# Patient Record
Sex: FEMALE | Race: WHITE | NOT HISPANIC OR LATINO | Employment: FULL TIME | URBAN - METROPOLITAN AREA
[De-identification: names, ages, dates, MRNs, and addresses within clinical notes are randomized per-mention and may not be internally consistent; named-entity substitution may affect disease eponyms.]

---

## 2017-02-21 ENCOUNTER — HISTORICAL (OUTPATIENT)
Dept: LAB | Facility: HOSPITAL | Age: 32
End: 2017-02-21

## 2018-12-10 ENCOUNTER — HISTORICAL (OUTPATIENT)
Dept: RADIOLOGY | Facility: HOSPITAL | Age: 33
End: 2018-12-10

## 2020-01-01 ENCOUNTER — HISTORICAL (OUTPATIENT)
Dept: ADMINISTRATIVE | Facility: HOSPITAL | Age: 35
End: 2020-01-01

## 2021-05-25 LAB
ALBUMIN SERPL-MCNC: 4.6 G/DL (ref 3.4–5)
ALBUMIN/GLOB SERPL: 1.5 {RATIO}
ALP SERPL-CCNC: 69 U/L (ref 50–144)
ALT SERPL-CCNC: 38 U/L (ref 1–45)
ANION GAP SERPL CALC-SCNC: 12 MMOL/L (ref 7–16)
AST SERPL-CCNC: 29 U/L (ref 14–36)
BASOPHILS # BLD AUTO: 0.02 X10(3)/MCL (ref 0.01–0.08)
BASOPHILS NFR BLD AUTO: 0.2 % (ref 0.1–1.2)
BILIRUB SERPL-MCNC: 0.63 MG/DL (ref 0.1–1)
BUN SERPL-MCNC: 10 MG/DL (ref 7–20)
CALCIUM SERPL-MCNC: 9.7 MG/DL (ref 8.4–10.2)
CHLORIDE SERPL-SCNC: 101 MMOL/L (ref 94–110)
CHOLEST SERPL-MCNC: 215 MG/DL (ref 0–200)
CO2 SERPL-SCNC: 26 MMOL/L (ref 21–32)
CREAT SERPL-MCNC: 0.5 MG/DL (ref 0.52–1.04)
CREAT/UREA NIT SERPL: 20 (ref 12–20)
EOSINOPHIL # BLD AUTO: 0.12 X10(3)/MCL (ref 0.04–0.36)
EOSINOPHIL NFR BLD AUTO: 1.3 % (ref 0.7–7)
ERYTHROCYTE [DISTWIDTH] IN BLOOD BY AUTOMATED COUNT: 12.1 % (ref 11–14.5)
EST. AVERAGE GLUCOSE BLD GHB EST-MCNC: 129 MG/DL (ref 70–115)
GLOBULIN SER-MCNC: 3.1 G/DL (ref 2–3.9)
GLUCOSE SERPL-MCNC: 102 MGM./DL (ref 70–115)
HBA1C MFR BLD: 6.3 % (ref 4–6)
HCT VFR BLD AUTO: 39 % (ref 36–48)
HDLC SERPL-MCNC: 56 MG/DL (ref 40–60)
HGB BLD-MCNC: 13.1 G/DL (ref 11.8–16)
IMM GRANULOCYTES # BLD AUTO: 0.02 X10E3/UL (ref 0–0.03)
IMM GRANULOCYTES NFR BLD AUTO: 0.2 % (ref 0–0.5)
LDLC SERPL CALC-MCNC: 128.3 MG/DL (ref 30–100)
LYMPHOCYTES # BLD AUTO: 3.15 X10(3)/MCL (ref 1.16–3.74)
LYMPHOCYTES NFR BLD AUTO: 34 % (ref 20–55)
MCH RBC QN AUTO: 32.5 PG (ref 27–34)
MCHC RBC AUTO-ENTMCNC: 33.6 G/DL (ref 31–37)
MCV RBC AUTO: 96.8 FL (ref 79–99)
MONOCYTES # BLD AUTO: 0.58 X10(3)/MCL (ref 0.24–0.36)
MONOCYTES NFR BLD AUTO: 6.3 % (ref 4.7–12.5)
NEUTROPHILS # BLD AUTO: 5.37 X10(3)/MCL (ref 1.56–6.13)
NEUTROPHILS NFR BLD AUTO: 58 % (ref 37–73)
PLATELET # BLD AUTO: 361 X10(3)/MCL (ref 140–371)
PMV BLD AUTO: 10.9 FL (ref 9.4–12.4)
POTASSIUM SERPL-SCNC: 4.7 MMOL/L (ref 3.5–5.1)
PROT SERPL-MCNC: 7.7 G/DL (ref 6.3–8.2)
RBC # BLD AUTO: 4.03 X10(6)/MCL (ref 4–5.1)
SODIUM SERPL-SCNC: 139 MMOL/L (ref 135–145)
T4 FREE SERPL-MCNC: 0.83 NG/DL (ref 0.78–2.19)
TRIGL SERPL-MCNC: 111 MG/DL (ref 30–200)
TSH SERPL-ACNC: 1.77 UIU/ML (ref 0.36–3.74)
WBC # SPEC AUTO: 9.3 X10(3)/MCL (ref 4–11.5)

## 2021-08-31 ENCOUNTER — HISTORICAL (OUTPATIENT)
Dept: LAB | Facility: HOSPITAL | Age: 36
End: 2021-08-31

## 2021-08-31 LAB
ALBUMIN SERPL-MCNC: 3.9 GM/DL (ref 3.5–5)
ALBUMIN/GLOB SERPL: 1.1 RATIO (ref 1.1–2)
ALP SERPL-CCNC: 79 UNIT/L (ref 40–150)
ALT SERPL-CCNC: 21 UNIT/L (ref 0–55)
AMYLASE SERPL-CCNC: 55 UNIT/L (ref 25–125)
AST SERPL-CCNC: 15 UNIT/L (ref 5–34)
BILIRUB SERPL-MCNC: 0.4 MG/DL
BILIRUBIN DIRECT+TOT PNL SERPL-MCNC: 0.2 MG/DL (ref 0–0.5)
BILIRUBIN DIRECT+TOT PNL SERPL-MCNC: 0.2 MG/DL (ref 0–0.8)
BUN SERPL-MCNC: 8 MG/DL (ref 7–18.7)
CALCIUM SERPL-MCNC: 9.7 MG/DL (ref 8.4–10.2)
CHLORIDE SERPL-SCNC: 103 MMOL/L (ref 98–107)
CHOLEST SERPL-MCNC: 208 MG/DL
CHOLEST/HDLC SERPL: 4 {RATIO} (ref 0–5)
CO2 SERPL-SCNC: 29 MMOL/L (ref 22–29)
CREAT SERPL-MCNC: 0.77 MG/DL (ref 0.55–1.02)
EST. AVERAGE GLUCOSE BLD GHB EST-MCNC: 131.2 MG/DL
GLOBULIN SER-MCNC: 3.4 GM/DL (ref 2.4–3.5)
GLUCOSE SERPL-MCNC: 131 MG/DL (ref 74–100)
HBA1C MFR BLD: 6.2 %
HDLC SERPL-MCNC: 50 MG/DL (ref 35–60)
LDLC SERPL CALC-MCNC: 137 MG/DL (ref 50–140)
LIPASE SERPL-CCNC: 38 U/L
POTASSIUM SERPL-SCNC: 5 MMOL/L (ref 3.5–5.1)
PROT SERPL-MCNC: 7.3 GM/DL (ref 6.4–8.3)
SODIUM SERPL-SCNC: 140 MMOL/L (ref 136–145)
TRIGL SERPL-MCNC: 105 MG/DL (ref 37–140)
VIT B12 SERPL-MCNC: 536 PG/ML (ref 213–816)
VLDLC SERPL CALC-MCNC: 21 MG/DL

## 2021-12-22 LAB
ALBUMIN SERPL-MCNC: 4.6 G/DL (ref 3.4–5)
ALBUMIN/GLOB SERPL: 1.5 {RATIO}
ALP SERPL-CCNC: 79 U/L (ref 50–144)
ALT SERPL-CCNC: 51 U/L (ref 1–45)
AMPHET UR QL SCN: ABNORMAL
ANION GAP SERPL CALC-SCNC: 8 MMOL/L (ref 2–13)
AST SERPL-CCNC: 59 U/L (ref 14–36)
BARBITURATE SCN PRESENT UR: ABNORMAL
BASOPHILS # BLD AUTO: 0.03 10*3/UL (ref 0.01–0.08)
BASOPHILS NFR BLD AUTO: 0.5 % (ref 0.1–1.2)
BENZODIAZ UR QL SCN: ABNORMAL
BILIRUB SERPL-MCNC: 0.39 MG/DL (ref 0–1)
BUN SERPL-MCNC: 8 MG/DL (ref 7–20)
CALCIUM SERPL-MCNC: 9.5 MG/DL (ref 8.4–10.2)
CANNABINOIDS UR QL SCN: ABNORMAL
CHLORIDE SERPL-SCNC: 105 MMOL/L (ref 94–110)
CO2 SERPL-SCNC: 28 MMOL/L (ref 21–32)
COCAINE UR QL SCN: ABNORMAL
CREAT SERPL-MCNC: 0.63 MG/DL (ref 0.52–1.04)
CREAT/UREA NIT SERPL: 12.7 (ref 12–20)
DEPRECATED CALCIDIOL+CALCIFEROL SERPL-MC: 43.9 NG/ML (ref 30–100)
EOSINOPHIL # BLD AUTO: 0.12 10*3/UL (ref 0.04–0.36)
EOSINOPHIL NFR BLD AUTO: 2 % (ref 0.7–7)
ERYTHROCYTE [DISTWIDTH] IN BLOOD BY AUTOMATED COUNT: 11.8 % (ref 11–14.5)
EST CREAT CLEARANCE SER (OHS): 112.06 ML/MIN
EST. AVERAGE GLUCOSE BLD GHB EST-MCNC: 201 MG/DL (ref 70–115)
GLOBULIN SER-MCNC: 3 G/DL (ref 2–3.9)
GLUCOSE SERPL-MCNC: 211 MGM./DL (ref 70–115)
HBA1C MFR BLD: 8.7 % (ref 4–6)
HCT VFR BLD AUTO: 39.2 % (ref 36–48)
HGB BLD-MCNC: 13.2 G/DL (ref 11.8–16)
IMM GRANULOCYTES # BLD AUTO: 0.02 10*3/UL (ref 0–0.03)
IMM GRANULOCYTES NFR BLD AUTO: 0.3 % (ref 0–0.5)
LYMPHOCYTES # BLD AUTO: 2.32 10*3/UL (ref 1.16–3.74)
LYMPHOCYTES NFR BLD AUTO: 39.2 % (ref 20–55)
MCH RBC QN AUTO: 32.2 PG (ref 27–34)
MCHC RBC AUTO-ENTMCNC: 33.7 G/DL (ref 31–37)
MCV RBC AUTO: 95.6 FL (ref 79–99)
METHADONE UR QL SCN: ABNORMAL
MONOCYTES # BLD AUTO: 0.54 10*3/UL (ref 0.24–0.36)
MONOCYTES NFR BLD AUTO: 9.1 % (ref 4.7–12.5)
NEUTROPHILS # BLD AUTO: 2.89 10*3/UL (ref 1.56–6.13)
NEUTROPHILS NFR BLD AUTO: 48.9 % (ref 37–73)
OPIATES UR QL SCN: ABNORMAL
PCP UR QL: ABNORMAL
PLATELET # BLD AUTO: 348 10*3/UL (ref 140–371)
PMV BLD AUTO: 10.7 FL (ref 9.4–12.4)
POTASSIUM SERPL-SCNC: 4.7 MMOL/L (ref 3.5–5.1)
PROT SERPL-MCNC: 7.6 G/DL (ref 6.3–8.2)
RBC # BLD AUTO: 4.1 10*6/UL (ref 4–5.1)
SODIUM SERPL-SCNC: 141 MMOL/L (ref 135–145)
TSH SERPL-ACNC: 1.25 UIU/ML (ref 0.36–3.74)
VIT B12 SERPL-MCNC: 672 PG/ML (ref 211–946)
WBC # SPEC AUTO: 5.9 10*3/UL (ref 4–11.5)

## 2022-04-10 ENCOUNTER — HISTORICAL (OUTPATIENT)
Dept: ADMINISTRATIVE | Facility: HOSPITAL | Age: 37
End: 2022-04-10

## 2022-04-25 VITALS
OXYGEN SATURATION: 100 % | DIASTOLIC BLOOD PRESSURE: 76 MMHG | BODY MASS INDEX: 22.46 KG/M2 | WEIGHT: 126.75 LBS | HEIGHT: 63 IN | SYSTOLIC BLOOD PRESSURE: 100 MMHG

## 2022-05-01 ENCOUNTER — HISTORICAL (OUTPATIENT)
Dept: ADMINISTRATIVE | Facility: HOSPITAL | Age: 37
End: 2022-05-01

## 2022-05-03 NOTE — HISTORICAL [facility]ER
This is a historical note converted from Phill. Formatting and pictures may have been removed.  Please reference Phill for original formatting and attached multimedia. Chief Complaint   I dont know, I guess med refills  History of Present Illness  36 WF PMH DM2, ADHD, Anxiety/Depression, psoriasis, smoker in office for ADHD for a routine visit. She is established with endocrinology and last seen in 2/2021.? She has been on immediate release Adderall since she was in her 20s and previously seeing in office PMNHNP.? She is in need of refills on adderall.? She reports that blood sugar is well controlled, no highs.? She is starting to have cravings again but they had stopped when she started Victoza. She has no complaints today and states that all medications are working well without side effects.  Review of Systems  See HPI  Physical Exam  Vitals & Measurements  T:?36.7? ?C (Temporal Artery)? HR:?84(Peripheral)? BP:?98/70?  HT:?160.00?cm? WT:?61.000?kg? BMI:?23.83? LMP:?05/01/2021 00:00 CDT?  Constitutional: General Appearance: healthy-appearing, well-nourished, and well-developed. Level of Distress: NAD.  ?   Psychiatric: Insight: good judgement. Mental Status: active and alert and normal mood.  ?   Head: Head: normocephalic and atraumatic.  ?   ENMT: Hearing: Conversational Hearing Normal. Oropharynx: moist mucous membranes.  ?   Lungs: Respiratory effort: no dyspnea. Auscultation: no wheezing, rales/crackles, or rhonchi.  ?   Cardiovascular: Apical Impulse: not displaced. Heart Auscultation: RRR and no murmurs.  ?   Abdomen: Bowel Sounds: normal. Inspection and Palpation: soft, non-distended, and no tenderness.  ?   Neurologic: Gait and Station: normal gait and station.  ?  Skin: Inspection and palpation: good turgor and no jaundice; scalp psoriasis.  Assessment/Plan  1.?Encounter for well adult exam with abnormal findings ? Z00.01  2.?Major depressive disorder, recurrent episode, moderate ? F33.1  3.?Diabetes  mellitus ? E11.9  4.?Attention deficit hyperactivity disorder (ADHD), predominantly inattentive type ? F90.0  Orders:  amphetamine-dextroamphetamine, 30 mg = 1 tab(s), Oral, BID, # 60 tab(s), 0 Refill(s), Pharmacy: [facility] #[id number], Patient Education Provided, Patient Verbalizes Understanding, 160, cm, Height/Length Dosing, 05/25/21 13:27:00 CDT, 61, kg, Weight Dosing, 05/25/21 13:27:00 CDT  amphetamine-dextroamphetamine, 30 mg = 1 tab(s), Oral, BID, # 60 tab(s), 0 Refill(s), Pharmacy: [facility] #[id number], 160, cm, Height/Length Dosing, 05/25/21 13:27:00 CDT, 61, kg, Weight Dosing, 05/25/21 13:27:00 CDT  valACYclovir, 500 mg = 1 tab(s), Oral, Daily, # 30 tab(s), 0 Refill(s), Pharmacy: [facility] #[id number], 160, cm, Height/Length Dosing, 05/25/21 13:27:00 CDT, 61, kg, Weight Dosing, 05/25/21 13:27:00 CDT  Office/Outpatient Visit Level 4 Established [id number] PC, Major depressive disorder, recurrent episode, moderate  Diabetes mellitus  Attention deficit hyperactivity disorder (ADHD), predominantly inattentive type, [facility], 05/25/21 13:45:00 CDT  ?  labs collected in office, will call with abnormal results.  continue adderall ir.?  she will continue seeing PMHNP in office once available  keep all specialist appointments.  RTC annually for wellness.  ?   Problem List/Past Medical History  Ongoing  Anemia  Anxiety  Attention deficit hyperactivity disorder (ADHD), predominantly inattentive type  Diabetes mellitus  Insomnia  Major depressive disorder, recurrent episode, moderate  Panic attacks  Historical  Pregnant  Pregnant  Psoriasis  Tobacco user  Procedure/Surgical History  Injection, epidural, of blood or clot patch (01/16/2020)  Introduction of Other Therapeutic Substance into Epidural Space, Percutaneous Approach (01/16/2020)  Drainage of Spinal Canal, Percutaneous Approach, Diagnostic (01/08/2020)  PAP SMEAR-HPV + (03/11/2019)  Destruction of Bilateral Fallopian Tubes, Percutaneous  Endoscopic Approach (10/28/2016)  Laparoscopy, surgical; with fulguration of oviducts (with or without transection) (10/28/2016)  Tubal Ligation Laparoscopic (.) (10/28/2016)  Tubal Ligation (10/01/2016)   Medications  Adderall 30 mg oral tablet, 30 mg= 1 tab(s), Oral, BID  Adderall 30 mg oral tablet, 30 mg= 1 tab(s), Oral, BID  Blood glucose monitor, See Instructions  citric acid-sodium citrate 334 mg-500 mg/5 mL oral solution, 30 mL, Oral, Once  glipizide-metformin 2.5 mg-500 mg oral tablet, 1 tab(s), Oral, BID  Glucose Test Strips, See Instructions, 11 refills  Hair, Skin & Nails, 5 mg, Oral, Daily  hydrOXYzine hydrochloride 10 mg oral tablet, See Instructions, PRN, 5 refills  Lancets, See Instructions, 11 refills  Lexapro 10 mg oral tablet, 10 mg= 1 tab(s), Oral, Daily  valacyclovir 500 mg oral tablet, 500 mg= 1 tab(s), Oral, Daily  Victoza 18 mg/3 mL subcutaneous injection, 1.8 mg= 1.8 mg, Subcutaneous, Daily  Allergies  sulfa drugs  Social History  Abuse/Neglect  No, 05/25/2021  No, 07/20/2020  Alcohol  Current, Beer, Liquor, 1-2 times per week, 10/26/2016  Sexual  Sexually active: Yes. Yes, 06/23/2020  Substance Use  Never, 12/05/2019  Tobacco  Former smoker, quit more than 30 days ago, Yes, 05/25/2021  Family History  Congestive heart disease.: Mother.  Hepatitis C.: Father.  Hypertension.: Mother.  Malignant tumor of breast: Maternal Grandmother and Maternal Grandmother.  Immunizations  Vaccine Date Status   poliovirus vaccine, inactivated 04/03/1990 Recorded   diphtheria/pertussis, acel/tetanus ped 04/03/1990 Recorded   poliovirus vaccine, inactivated 06/24/1986 Recorded   measles/mumps/rubella virus vaccine 06/24/1986 Recorded   diphtheria/pertussis, acel/tetanus ped 06/24/1986 Recorded   diphtheria/pertussis, acel/tetanus ped 1985 Recorded   poliovirus vaccine, inactivated 1985 Recorded   diphtheria/pertussis, acel/tetanus ped 1985 Recorded   poliovirus vaccine, inactivated 1985  Recorded   diphtheria/pertussis, acel/tetanus ped 1985 Recorded   Health Maintenance  Health Maintenance  ???Pending?(in the next year)  ??? ??OverDue  ??? ? ? ?Alcohol Misuse Screening due??01/02/21??and every 1??year(s)  ??? ??Due?  ??? ? ? ?ADL Screening due??05/25/21??and every 1??year(s)  ??? ? ? ?Diabetes Maintenance-Foot Exam due??05/25/21??Unknown Frequency  ??? ? ? ?Diabetes Maintenance-Microalbumin due??05/25/21??Unknown Frequency  ??? ? ? ?Tetanus Vaccine due??05/25/21??and every 10??year(s)  ??? ??Due In Future?  ??? ? ? ?Obesity Screening not due until??01/01/22??and every 1??year(s)  ??? ? ? ?Diabetes Maintenance-Eye Exam not due until??04/21/22??and every 1??year(s)  ???Satisfied?(in the past 1 year)  ??? ??Satisfied?  ??? ? ? ?Blood Pressure Screening on??05/25/21.??Satisfied by Jermaine Ortega  ??? ? ? ?Body Mass Index Check on??05/25/21.??Satisfied by Jermaine Ortega  ??? ? ? ?Cervical Cancer Screening on??06/24/20.??Satisfied by Yvonne Simental MA  ??? ? ? ?Diabetes Maintenance-Fasting Lipid Profile on??05/25/21.??Satisfied by Contributor_system, JENNINGS_EVIDENT  ??? ? ? ?Diabetes Screening on??05/25/21.??Satisfied by Contributor_system, JENNINGS_EVIDENT  ??? ? ? ?Influenza Vaccine on??02/10/21.??Satisfied by Kamille Davison  ??? ? ? ?Obesity Screening on??05/25/21.??Satisfied by Jermaine Ortega  ??? ??Refused?  ??? ? ? ?Influenza Vaccine on??05/25/21.??Recorded by Jermaine Ortega??Reason: Patient Refuses  ?

## 2022-05-21 ENCOUNTER — HISTORICAL (OUTPATIENT)
Dept: ADMINISTRATIVE | Facility: HOSPITAL | Age: 37
End: 2022-05-21

## 2022-08-23 ENCOUNTER — HISTORICAL (OUTPATIENT)
Dept: ADMINISTRATIVE | Facility: HOSPITAL | Age: 37
End: 2022-08-23

## 2022-08-23 LAB
LEFT EYE DM RETINOPATHY: NEGATIVE
RIGHT EYE DM RETINOPATHY: NEGATIVE

## 2022-08-24 LAB
HUMAN PAPILLOMAVIRUS (HPV): NORMAL
PAP RECOMMENDATION EXT: NORMAL
PAP SMEAR: NORMAL

## 2023-01-10 ENCOUNTER — DOCUMENTATION ONLY (OUTPATIENT)
Dept: ADMINISTRATIVE | Facility: HOSPITAL | Age: 38
End: 2023-01-10

## 2023-02-27 LAB
LEFT EYE DM RETINOPATHY: NEGATIVE
RIGHT EYE DM RETINOPATHY: NEGATIVE

## 2023-03-03 ENCOUNTER — DOCUMENTATION ONLY (OUTPATIENT)
Dept: FAMILY MEDICINE | Facility: CLINIC | Age: 38
End: 2023-03-03
Payer: MEDICAID

## 2023-03-29 ENCOUNTER — OFFICE VISIT (OUTPATIENT)
Dept: BEHAVIORAL HEALTH | Facility: CLINIC | Age: 38
End: 2023-03-29
Payer: MEDICAID

## 2023-03-29 VITALS
TEMPERATURE: 97 F | HEART RATE: 101 BPM | OXYGEN SATURATION: 99 % | DIASTOLIC BLOOD PRESSURE: 80 MMHG | BODY MASS INDEX: 26.87 KG/M2 | WEIGHT: 146 LBS | HEIGHT: 62 IN | SYSTOLIC BLOOD PRESSURE: 110 MMHG

## 2023-03-29 DIAGNOSIS — G47.00 INSOMNIA, UNSPECIFIED TYPE: ICD-10-CM

## 2023-03-29 DIAGNOSIS — F90.9 ATTENTION DEFICIT HYPERACTIVITY DISORDER (ADHD), UNSPECIFIED ADHD TYPE: ICD-10-CM

## 2023-03-29 DIAGNOSIS — F41.1 GENERALIZED ANXIETY DISORDER: ICD-10-CM

## 2023-03-29 DIAGNOSIS — Z72.0 TOBACCO USER: ICD-10-CM

## 2023-03-29 DIAGNOSIS — F41.0 PANIC ATTACK: ICD-10-CM

## 2023-03-29 DIAGNOSIS — F33.1 MAJOR DEPRESSIVE DISORDER, RECURRENT EPISODE, MODERATE: Primary | ICD-10-CM

## 2023-03-29 PROCEDURE — 3008F PR BODY MASS INDEX (BMI) DOCUMENTED: ICD-10-PCS | Mod: CPTII,,, | Performed by: NURSE PRACTITIONER

## 2023-03-29 PROCEDURE — 3074F SYST BP LT 130 MM HG: CPT | Mod: CPTII,,, | Performed by: NURSE PRACTITIONER

## 2023-03-29 PROCEDURE — 3079F DIAST BP 80-89 MM HG: CPT | Mod: CPTII,,, | Performed by: NURSE PRACTITIONER

## 2023-03-29 PROCEDURE — 3079F PR MOST RECENT DIASTOLIC BLOOD PRESSURE 80-89 MM HG: ICD-10-PCS | Mod: CPTII,,, | Performed by: NURSE PRACTITIONER

## 2023-03-29 PROCEDURE — 3074F PR MOST RECENT SYSTOLIC BLOOD PRESSURE < 130 MM HG: ICD-10-PCS | Mod: CPTII,,, | Performed by: NURSE PRACTITIONER

## 2023-03-29 PROCEDURE — 1160F RVW MEDS BY RX/DR IN RCRD: CPT | Mod: CPTII,,, | Performed by: NURSE PRACTITIONER

## 2023-03-29 PROCEDURE — 1160F PR REVIEW ALL MEDS BY PRESCRIBER/CLIN PHARMACIST DOCUMENTED: ICD-10-PCS | Mod: CPTII,,, | Performed by: NURSE PRACTITIONER

## 2023-03-29 PROCEDURE — 1159F PR MEDICATION LIST DOCUMENTED IN MEDICAL RECORD: ICD-10-PCS | Mod: CPTII,,, | Performed by: NURSE PRACTITIONER

## 2023-03-29 PROCEDURE — 99214 PR OFFICE/OUTPT VISIT, EST, LEVL IV, 30-39 MIN: ICD-10-PCS | Mod: SA,HB,, | Performed by: NURSE PRACTITIONER

## 2023-03-29 PROCEDURE — 1159F MED LIST DOCD IN RCRD: CPT | Mod: CPTII,,, | Performed by: NURSE PRACTITIONER

## 2023-03-29 PROCEDURE — 99214 OFFICE O/P EST MOD 30 MIN: CPT | Mod: SA,HB,, | Performed by: NURSE PRACTITIONER

## 2023-03-29 PROCEDURE — 3008F BODY MASS INDEX DOCD: CPT | Mod: CPTII,,, | Performed by: NURSE PRACTITIONER

## 2023-03-29 RX ORDER — PANTOPRAZOLE SODIUM 40 MG/1
40 TABLET, DELAYED RELEASE ORAL DAILY
COMMUNITY
Start: 2023-03-27 | End: 2023-08-17 | Stop reason: ALTCHOICE

## 2023-03-29 RX ORDER — VALACYCLOVIR HYDROCHLORIDE 500 MG/1
500 TABLET, FILM COATED ORAL DAILY
COMMUNITY
Start: 2023-03-27 | End: 2023-09-25

## 2023-03-29 RX ORDER — INSULIN GLARGINE 100 [IU]/ML
16 INJECTION, SOLUTION SUBCUTANEOUS NIGHTLY
COMMUNITY
Start: 2023-03-27

## 2023-03-29 RX ORDER — METFORMIN HYDROCHLORIDE 500 MG/1
500 TABLET, EXTENDED RELEASE ORAL 2 TIMES DAILY
COMMUNITY
Start: 2023-03-28

## 2023-03-29 RX ORDER — DEXTROAMPHETAMINE SACCHARATE, AMPHETAMINE ASPARTATE, DEXTROAMPHETAMINE SULFATE AND AMPHETAMINE SULFATE 7.5; 7.5; 7.5; 7.5 MG/1; MG/1; MG/1; MG/1
30 TABLET ORAL 2 TIMES DAILY
Qty: 60 TABLET | Refills: 0 | Status: SHIPPED | OUTPATIENT
Start: 2023-03-29 | End: 2023-04-28

## 2023-03-29 RX ORDER — LINACLOTIDE 72 UG/1
72 CAPSULE, GELATIN COATED ORAL EVERY MORNING
COMMUNITY
Start: 2023-03-09 | End: 2023-05-08

## 2023-03-29 RX ORDER — GLIMEPIRIDE 4 MG/1
4 TABLET ORAL DAILY
COMMUNITY
Start: 2023-03-27 | End: 2023-09-19

## 2023-03-29 RX ORDER — ESCITALOPRAM OXALATE 20 MG/1
20 TABLET ORAL DAILY
COMMUNITY
Start: 2023-03-09 | End: 2023-03-29

## 2023-03-29 RX ORDER — DEXTROAMPHETAMINE SACCHARATE, AMPHETAMINE ASPARTATE, DEXTROAMPHETAMINE SULFATE AND AMPHETAMINE SULFATE 7.5; 7.5; 7.5; 7.5 MG/1; MG/1; MG/1; MG/1
30 TABLET ORAL 2 TIMES DAILY
Qty: 60 TABLET | Refills: 0 | Status: SHIPPED | OUTPATIENT
Start: 2023-04-28 | End: 2023-05-28

## 2023-03-29 RX ORDER — BLOOD-GLUCOSE SENSOR
1 EACH MISCELLANEOUS
COMMUNITY
Start: 2023-03-09

## 2023-03-29 RX ORDER — DEXTROAMPHETAMINE SACCHARATE, AMPHETAMINE ASPARTATE, DEXTROAMPHETAMINE SULFATE AND AMPHETAMINE SULFATE 7.5; 7.5; 7.5; 7.5 MG/1; MG/1; MG/1; MG/1
30 TABLET ORAL 2 TIMES DAILY
Qty: 60 TABLET | Refills: 0 | Status: SHIPPED | OUTPATIENT
Start: 2023-05-28 | End: 2023-07-24 | Stop reason: SDUPTHER

## 2023-03-29 RX ORDER — ROSUVASTATIN CALCIUM 10 MG/1
10 TABLET, COATED ORAL NIGHTLY
COMMUNITY
Start: 2023-03-09

## 2023-03-29 RX ORDER — VORTIOXETINE 10 MG/1
10 TABLET, FILM COATED ORAL DAILY
Qty: 7 TABLET | Refills: 0
Start: 2023-03-29 | End: 2023-04-05

## 2023-03-29 RX ORDER — TIRZEPATIDE 2.5 MG/.5ML
1 INJECTION, SOLUTION SUBCUTANEOUS WEEKLY
COMMUNITY
Start: 2023-03-18 | End: 2023-04-21 | Stop reason: DRUGHIGH

## 2023-03-29 NOTE — PROGRESS NOTES
"PSYCHIATRIC FOLLOW-UP VISIT NOTE    Chief Complaint   Patient presents with    Depression     "I am having too many depressive episodes and not coping well with them. I would like a med change."         History of Present Illness  37 y.o. year old White female with hx of MDD, COLUMBA, panic attacks, ADHD, and insomnia seen today for follow-up appointment and medication management.  For the past several weeks she is noticed mood has been more depressed multiple days per week.  Her previous coping skills are no longer effective to help her deal with her increased depression effectively.  She is had some increased stress spinning what she describes as giving to local [employer] razors.  Denies that this is excessive spending and denies that it was impulsive.  Feels that helping others helps her mood as well.  She is not put herself in any financial difficulties by these donations and does not feel that there impulsive or out of her control.  Reports things are going well at home.  Adderall still effective for focus and concentration.  Good executive function.  Denies any side effects from current medications.  Denies any feelings of hopelessness, appetite changes, or tearful episodes.  She is sleeping well and feels rested in the morning.  She expressed a desire to stop her Lexapro and try different antidepressant at this time.  We have discussed this previously and today we agreed to have her take a half dose of her Lexapro for the next 3 days then discontinue.  She will start Trintellix 10 mg tomorrow morning and we will re-evaluate her depressive symptoms in a few weeks. Patient denies SI/HI. Denies hallucinations and does not appear to be responding to internal stimuli or be internally preoccupied. No manic symptoms noted.       Objective:     Vitals:  Vitals:    03/29/23 1010   BP: 110/80   BP Location: Left arm   Patient Position: Sitting   Pulse: 101   Temp: 97 °F (36.1 °C)   TempSrc: Temporal   SpO2: 99% " "  Weight: 66.2 kg (146 lb)   Height: 5' 2" (1.575 m)       Wt Readings from Last 3 Encounters:   03/29/23 1010 66.2 kg (146 lb)   12/22/21 1047 57.5 kg (126 lb 12.2 oz)   09/17/21 0942 58.2 kg (128 lb 4.9 oz)   08/12/21 1335 57 kg (125 lb 10.6 oz)   07/27/21 1016 59.4 kg (130 lb 15.3 oz)   05/25/21 1323 61 kg (134 lb 7.7 oz)   02/10/21 1010 69.6 kg (153 lb 7 oz)   11/19/20 1107 68.3 kg (150 lb 9.2 oz)   10/22/20 1402 68 kg (149 lb 14.6 oz)   06/26/20 1451 55.5 kg (122 lb 5.7 oz)   06/24/20 1513 55 kg (121 lb 4.1 oz)   02/17/20 0956 62.8 kg (138 lb 7.2 oz)   12/23/19 1317 65.1 kg (143 lb 8.3 oz)         Medication:    Current Outpatient Medications:     DEXCOM G6 SENSOR Zara, 1 application by Implant route every 10 days., Disp: , Rfl:     glimepiride (AMARYL) 4 MG tablet, Take 4 mg by mouth Daily., Disp: , Rfl:     LANTUS SOLOSTAR U-100 INSULIN glargine 100 units/mL SubQ pen, Inject 20 Units into the skin nightly., Disp: , Rfl:     LINZESS 72 mcg Cap capsule, Take 72 mcg by mouth every morning., Disp: , Rfl:     metFORMIN (GLUCOPHAGE-XR) 500 MG ER 24hr tablet, Take 500 mg by mouth 2 (two) times daily., Disp: , Rfl:     MOUNJARO 2.5 mg/0.5 mL PnIj, Inject 1 pen into the skin once a week., Disp: , Rfl:     pantoprazole (PROTONIX) 40 MG tablet, Take 40 mg by mouth once daily., Disp: , Rfl:     rosuvastatin (CRESTOR) 10 MG tablet, Take 10 mg by mouth every evening., Disp: , Rfl:     valACYclovir (VALTREX) 500 MG tablet, Take 500 mg by mouth once daily., Disp: , Rfl:     dextroamphetamine-amphetamine (ADDERALL) 30 mg Tab, Take 1 tablet (30 mg total) by mouth 2 (two) times a day., Disp: 60 tablet, Rfl: 0    [START ON 4/28/2023] dextroamphetamine-amphetamine (ADDERALL) 30 mg Tab, Take 1 tablet (30 mg total) by mouth 2 (two) times a day., Disp: 60 tablet, Rfl: 0    [START ON 5/28/2023] dextroamphetamine-amphetamine (ADDERALL) 30 mg Tab, Take 1 tablet (30 mg total) by mouth 2 (two) times a day., Disp: 60 tablet, Rfl: 0    " "vortioxetine (TRINTELLIX) 10 mg Tab, Take 1 tablet (10 mg total) by mouth once daily., Disp: 30 tablet, Rfl: 1    vortioxetine (TRINTELLIX) 10 mg Tab, Take 1 tablet (10 mg total) by mouth Daily. for 7 days, Disp: 7 tablet, Rfl: 0       Significant Labs: - none at this time    Significant Imaging: - none at this time    Physical Exam  Vitals and nursing note reviewed.   Constitutional:       General: She is awake.      Appearance: Normal appearance.   Musculoskeletal:      Comments: Full ROM   Neurological:      Mental Status: She is alert.   Psychiatric:         Attention and Perception: Attention and perception normal. She does not perceive auditory or visual hallucinations.         Mood and Affect: Affect normal.         Speech: Speech normal.         Behavior: Behavior is cooperative.         Thought Content: Thought content does not include homicidal or suicidal ideation.         Cognition and Memory: Cognition and memory normal.        Review of Systems     Mental Status Exam:  Presentation:  - Appearance: 37 y.o. year old White female, appears stated age, appears Casually dressed and Well groomed  - Motility: Erect when standing, Steady gait, No EPS or Tremors, No psychomotor agitation or retardation appreciated  - Behavior: calm, cooperative, good eye contact  Speech:  - Character/Organization: spontaneous, fluent, normal volume, normal rate, normal rhythm  Emotional State:  - Mood: "depressed"   - Affect: congruent, sad, and anxious  Thought:  - Process: logical, linear, organized , goal-directed  - Preoccupations: no ruminations, rituals, or phobias appreciated  - Delusions: no persecutory, paranoid, or grandiose delusions appreciated  - Perception: denies AVH, not actively responding to internal stimuli  - SI/HI: denies/denies  Sensorium & Intellect:  - Sensorium: AAOx4  - Memory: intact to recent and remote events  - Attention/Concentration: good/good  - Insight/Judgement: good/good    Gait: normal swing " and stance  MSK:no rigidity appreciated    All other systems without acute issues unless noted in HPI      Assessment/Plan      ICD-10-CM ICD-9-CM    1. Major depressive disorder, recurrent episode, moderate  F33.1 296.32 vortioxetine (TRINTELLIX) 10 mg Tab      vortioxetine (TRINTELLIX) 10 mg Tab      2. Panic attack  F41.0 300.01       3. Generalized anxiety disorder  F41.1 300.02       4. Attention deficit hyperactivity disorder (ADHD), unspecified ADHD type  F90.9 314.01 dextroamphetamine-amphetamine (ADDERALL) 30 mg Tab      dextroamphetamine-amphetamine (ADDERALL) 30 mg Tab      dextroamphetamine-amphetamine (ADDERALL) 30 mg Tab      5. Insomnia, unspecified type  G47.00 780.52       6. Tobacco user  Z72.0 305.1          Take 10 mg of Lexapro for the next 3 days then discontinue    Begin Trintellix 10 mg daily tomorrow    Continue other medications without change    Reviewed non-pharmacologic coping skills to decrease anxiety, such as deep breathing, journaling, exercise, recognizing triggers, meditation, healthy diet and exercise, routine sleep schedule, negative thought recognition, time for hobbies/interests, relaxation techniques, avoidance of substance abuse, limiting caffeine, benefits of counseling, and biofeedback. Patient verbalized understanding.     checked. Results as expected. No suspected diversion or abuse of controlled substances.    Potential side effects and risks vs benefits of current treatment plan reviewed with patient. Applicable black box warnings reviewed. Encouraged patient not to alter dosages or abruptly discontinue medications without contacting prescriber first, due to risk of worsening symptoms and decompensation of mental status. Warned of risks associated with herbal remedies and supplements while taking psychotropic medications and of the need to consult prescriber prior to adding any of these to current regimen. Patient should abstain from abuse of alcohol, prescription  medications, and illicit drugs. Reviewed when to contact clinic and/or seek emergent care, such as but not limited to, onset/worsening SI/HI, hallucinations, delusions, manic symptoms. Pt verbalized understanding and agreement of these warnings/recommendations and verbally consented to treatment plan.      Follow up in about 4 weeks (around 4/26/2023) for Medication Management.    Yefri Medrano, LUCHOP

## 2023-03-31 ENCOUNTER — DOCUMENTATION ONLY (OUTPATIENT)
Dept: BEHAVIORAL HEALTH | Facility: CLINIC | Age: 38
End: 2023-03-31
Payer: MEDICAID

## 2023-04-03 ENCOUNTER — TELEPHONE (OUTPATIENT)
Dept: FAMILY MEDICINE | Facility: CLINIC | Age: 38
End: 2023-04-03
Payer: MEDICAID

## 2023-04-03 NOTE — TELEPHONE ENCOUNTER
Pt states that the trintellix is not working she wants to go back to the Lexapro, I spoke to Devon Medrano and he advised she restart the lexapro d/c the trintellix and if she is not better by Thursday, she should call to come in sooner then the f/u that is scheduled for the 21st. Pt verbalized understanding

## 2023-04-21 ENCOUNTER — OFFICE VISIT (OUTPATIENT)
Dept: BEHAVIORAL HEALTH | Facility: CLINIC | Age: 38
End: 2023-04-21
Payer: MEDICAID

## 2023-04-21 VITALS
DIASTOLIC BLOOD PRESSURE: 70 MMHG | SYSTOLIC BLOOD PRESSURE: 106 MMHG | WEIGHT: 145 LBS | BODY MASS INDEX: 26.68 KG/M2 | HEART RATE: 91 BPM | HEIGHT: 62 IN | OXYGEN SATURATION: 97 % | TEMPERATURE: 98 F

## 2023-04-21 DIAGNOSIS — G47.00 INSOMNIA, UNSPECIFIED TYPE: ICD-10-CM

## 2023-04-21 DIAGNOSIS — F41.1 GENERALIZED ANXIETY DISORDER: ICD-10-CM

## 2023-04-21 DIAGNOSIS — F41.0 PANIC ATTACK: ICD-10-CM

## 2023-04-21 DIAGNOSIS — F33.1 MAJOR DEPRESSIVE DISORDER, RECURRENT EPISODE, MODERATE: Primary | ICD-10-CM

## 2023-04-21 DIAGNOSIS — F90.9 ATTENTION DEFICIT HYPERACTIVITY DISORDER (ADHD), UNSPECIFIED ADHD TYPE: ICD-10-CM

## 2023-04-21 PROCEDURE — 1160F RVW MEDS BY RX/DR IN RCRD: CPT | Mod: CPTII,,, | Performed by: NURSE PRACTITIONER

## 2023-04-21 PROCEDURE — 1159F MED LIST DOCD IN RCRD: CPT | Mod: CPTII,,, | Performed by: NURSE PRACTITIONER

## 2023-04-21 PROCEDURE — 1159F PR MEDICATION LIST DOCUMENTED IN MEDICAL RECORD: ICD-10-PCS | Mod: CPTII,,, | Performed by: NURSE PRACTITIONER

## 2023-04-21 PROCEDURE — 1160F PR REVIEW ALL MEDS BY PRESCRIBER/CLIN PHARMACIST DOCUMENTED: ICD-10-PCS | Mod: CPTII,,, | Performed by: NURSE PRACTITIONER

## 2023-04-21 PROCEDURE — 3008F BODY MASS INDEX DOCD: CPT | Mod: CPTII,,, | Performed by: NURSE PRACTITIONER

## 2023-04-21 PROCEDURE — 99214 OFFICE O/P EST MOD 30 MIN: CPT | Mod: SA,HB,, | Performed by: NURSE PRACTITIONER

## 2023-04-21 PROCEDURE — 3074F PR MOST RECENT SYSTOLIC BLOOD PRESSURE < 130 MM HG: ICD-10-PCS | Mod: CPTII,,, | Performed by: NURSE PRACTITIONER

## 2023-04-21 PROCEDURE — 3074F SYST BP LT 130 MM HG: CPT | Mod: CPTII,,, | Performed by: NURSE PRACTITIONER

## 2023-04-21 PROCEDURE — 3078F DIAST BP <80 MM HG: CPT | Mod: CPTII,,, | Performed by: NURSE PRACTITIONER

## 2023-04-21 PROCEDURE — 3008F PR BODY MASS INDEX (BMI) DOCUMENTED: ICD-10-PCS | Mod: CPTII,,, | Performed by: NURSE PRACTITIONER

## 2023-04-21 PROCEDURE — 3078F PR MOST RECENT DIASTOLIC BLOOD PRESSURE < 80 MM HG: ICD-10-PCS | Mod: CPTII,,, | Performed by: NURSE PRACTITIONER

## 2023-04-21 PROCEDURE — 99214 PR OFFICE/OUTPT VISIT, EST, LEVL IV, 30-39 MIN: ICD-10-PCS | Mod: SA,HB,, | Performed by: NURSE PRACTITIONER

## 2023-04-21 RX ORDER — HYDROXYZINE HYDROCHLORIDE 10 MG/1
1 TABLET, FILM COATED ORAL NIGHTLY
COMMUNITY
Start: 2023-04-15 | End: 2023-05-19 | Stop reason: SDUPTHER

## 2023-04-21 RX ORDER — TIRZEPATIDE 5 MG/.5ML
1 INJECTION, SOLUTION SUBCUTANEOUS WEEKLY
COMMUNITY
Start: 2023-04-12 | End: 2023-09-19

## 2023-04-21 RX ORDER — ESCITALOPRAM OXALATE 20 MG/1
20 TABLET ORAL DAILY
COMMUNITY
Start: 2023-04-20 | End: 2023-05-19 | Stop reason: SDUPTHER

## 2023-04-21 NOTE — PROGRESS NOTES
"PSYCHIATRIC FOLLOW-UP VISIT NOTE    Chief Complaint   Patient presents with    Mood     "I feel better since restarting the lexapro."         History of Present Illness  38 y.o. year old White female with hx of MDD, panic attacks, COLUMBA, ADHD, and insomnia seen today for follow-up appointment and medication management.  Reports she is been doing well since resuming Lexapro.  She was unable to tolerate the discontinuation of that medication in initiation of Trintellix.  She was feeling increased depression and anxiety and tearful daily during that time.  Since stopping Trintellix and resuming Lexapro she is been feeling much better.  Describes depression is minimal at this time and continued improvement over the last week.  No recent panic attacks during that time.  Feels anxiety is more well-controlled at this point.  Denies any side effects from current medication regimen.  She is made an initial counseling appointment with Benigno Lester and we will begin those sessions next week.  She is looking forward to this and feels that she is ready to begin that process.  She is sleeping well and feels rested in the morning.  Also reports improved blood sugar control following medication adjustments by her PCP.  Focus and concentration are good.  No issues with hyperactivity, task completion, or executive function.  Patient denies SI/HI. Denies hallucinations and does not appear to be responding to internal stimuli or be internally preoccupied. No manic symptoms noted.       Objective:     Vitals:  Vitals:    04/21/23 1139   BP: 106/70   BP Location: Left arm   Patient Position: Sitting   Pulse: 91   Temp: 98.4 °F (36.9 °C)   TempSrc: Temporal   SpO2: 97%   Weight: 65.8 kg (145 lb)   Height: 5' 2" (1.575 m)       Wt Readings from Last 3 Encounters:   04/21/23 1139 65.8 kg (145 lb)   03/29/23 1010 66.2 kg (146 lb)   12/22/21 1047 57.5 kg (126 lb 12.2 oz)   09/17/21 0942 58.2 kg (128 lb 4.9 oz)   08/12/21 1335 57 kg (125 lb " 10.6 oz)   07/27/21 1016 59.4 kg (130 lb 15.3 oz)   05/25/21 1323 61 kg (134 lb 7.7 oz)   02/10/21 1010 69.6 kg (153 lb 7 oz)   11/19/20 1107 68.3 kg (150 lb 9.2 oz)   10/22/20 1402 68 kg (149 lb 14.6 oz)   06/26/20 1451 55.5 kg (122 lb 5.7 oz)   06/24/20 1513 55 kg (121 lb 4.1 oz)   02/17/20 0956 62.8 kg (138 lb 7.2 oz)   12/23/19 1317 65.1 kg (143 lb 8.3 oz)         Medication:    Current Outpatient Medications:     DEXCOM G6 SENSOR Zara, 1 application by Implant route every 10 days., Disp: , Rfl:     dextroamphetamine-amphetamine (ADDERALL) 30 mg Tab, Take 1 tablet (30 mg total) by mouth 2 (two) times a day., Disp: 60 tablet, Rfl: 0    [START ON 4/28/2023] dextroamphetamine-amphetamine (ADDERALL) 30 mg Tab, Take 1 tablet (30 mg total) by mouth 2 (two) times a day., Disp: 60 tablet, Rfl: 0    [START ON 5/28/2023] dextroamphetamine-amphetamine (ADDERALL) 30 mg Tab, Take 1 tablet (30 mg total) by mouth 2 (two) times a day., Disp: 60 tablet, Rfl: 0    EScitalopram oxalate (LEXAPRO) 20 MG tablet, Take 20 mg by mouth Daily., Disp: , Rfl:     glimepiride (AMARYL) 4 MG tablet, Take 4 mg by mouth Daily., Disp: , Rfl:     hydrOXYzine HCL (ATARAX) 10 MG Tab, Take 1 tablet by mouth nightly., Disp: , Rfl:     LANTUS SOLOSTAR U-100 INSULIN glargine 100 units/mL SubQ pen, Inject 16 Units into the skin nightly., Disp: , Rfl:     LINZESS 72 mcg Cap capsule, Take 72 mcg by mouth every morning., Disp: , Rfl:     metFORMIN (GLUCOPHAGE-XR) 500 MG ER 24hr tablet, Take 500 mg by mouth 2 (two) times daily., Disp: , Rfl:     MOUNJARO 5 mg/0.5 mL PnIj, Inject 1 pen into the skin once a week., Disp: , Rfl:     pantoprazole (PROTONIX) 40 MG tablet, Take 40 mg by mouth once daily., Disp: , Rfl:     rosuvastatin (CRESTOR) 10 MG tablet, Take 10 mg by mouth every evening., Disp: , Rfl:     valACYclovir (VALTREX) 500 MG tablet, Take 500 mg by mouth once daily., Disp: , Rfl:        Significant Labs: - none at this time    Significant Imaging: -  "none at this time    Physical Exam  Vitals and nursing note reviewed.   Constitutional:       General: She is awake.      Appearance: Normal appearance.   Musculoskeletal:      Comments: Full ROM   Neurological:      Mental Status: She is alert.   Psychiatric:         Attention and Perception: Attention and perception normal. She does not perceive auditory or visual hallucinations.         Mood and Affect: Affect normal.         Speech: Speech normal.         Behavior: Behavior is cooperative.         Thought Content: Thought content does not include homicidal or suicidal ideation.         Cognition and Memory: Cognition and memory normal.        Review of Systems     Mental Status Exam:  Presentation:  - Appearance: 38 y.o. year old White female, appears stated age, appears Casually dressed and Well groomed  - Motility: Erect when standing, Steady gait, No EPS or Tremors, No psychomotor agitation or retardation appreciated  - Behavior: calm, cooperative, good eye contact  Speech:  - Character/Organization: spontaneous, fluent, normal volume, normal rate, normal rhythm  Emotional State:  - Mood: "improving"   - Affect: congruent and appropriate  Thought:  - Process: logical, linear, organized , goal-directed  - Preoccupations: no ruminations, rituals, or phobias appreciated  - Delusions: no persecutory, paranoid, or grandiose delusions appreciated  - Perception: denies AVH, not actively responding to internal stimuli  - SI/HI: denies/denies  Sensorium & Intellect:  - Sensorium: AAOx4  - Memory: intact to recent and remote events  - Attention/Concentration: good/good  - Insight/Judgement: good/good    Gait: normal swing and stance  MSK:no rigidity appreciated    All other systems without acute issues unless noted in HPI      Assessment/Plan      ICD-10-CM ICD-9-CM    1. Major depressive disorder, recurrent episode, moderate  F33.1 296.32       2. Panic attack  F41.0 300.01       3. Generalized anxiety disorder  F41.1 " 300.02       4. Attention deficit hyperactivity disorder (ADHD), unspecified ADHD type  F90.9 314.01       5. Insomnia, unspecified type  G47.00 780.52          Discontinue Trintellix    Continue other medications without change. Does not require refills today    Potential side effects and risks vs benefits of current treatment plan reviewed with patient. Applicable black box warnings reviewed. Encouraged patient not to alter dosages or abruptly discontinue medications without contacting prescriber first, due to risk of worsening symptoms and decompensation of mental status. Warned of risks associated with herbal remedies and supplements while taking psychotropic medications and of the need to consult prescriber prior to adding any of these to current regimen. Patient should abstain from abuse of alcohol, prescription medications, and illicit drugs. Reviewed when to contact clinic and/or seek emergent care, such as but not limited to, onset/worsening SI/HI, hallucinations, delusions, manic symptoms. Pt verbalized understanding and agreement of these warnings/recommendations and verbally consented to treatment plan.    Keep scheduled counseling appointment    Follow up in about 4 weeks (around 5/19/2023) for Medication Management.    Yefri Medrano, LUCHOP

## 2023-05-08 DIAGNOSIS — K59.00 CONSTIPATION, UNSPECIFIED CONSTIPATION TYPE: Primary | ICD-10-CM

## 2023-05-08 RX ORDER — LINACLOTIDE 72 UG/1
CAPSULE, GELATIN COATED ORAL
Qty: 30 CAPSULE | Refills: 1 | Status: SHIPPED | OUTPATIENT
Start: 2023-05-08 | End: 2023-08-17 | Stop reason: ALTCHOICE

## 2023-05-19 ENCOUNTER — OFFICE VISIT (OUTPATIENT)
Dept: BEHAVIORAL HEALTH | Facility: CLINIC | Age: 38
End: 2023-05-19
Payer: MEDICAID

## 2023-05-19 VITALS
WEIGHT: 140 LBS | SYSTOLIC BLOOD PRESSURE: 102 MMHG | OXYGEN SATURATION: 98 % | HEART RATE: 101 BPM | DIASTOLIC BLOOD PRESSURE: 74 MMHG | HEIGHT: 62 IN | BODY MASS INDEX: 25.76 KG/M2 | TEMPERATURE: 98 F

## 2023-05-19 DIAGNOSIS — Z72.0 TOBACCO USER: ICD-10-CM

## 2023-05-19 DIAGNOSIS — F41.0 PANIC ATTACK: ICD-10-CM

## 2023-05-19 DIAGNOSIS — F41.1 GENERALIZED ANXIETY DISORDER: ICD-10-CM

## 2023-05-19 DIAGNOSIS — F33.1 MAJOR DEPRESSIVE DISORDER, RECURRENT EPISODE, MODERATE: Primary | ICD-10-CM

## 2023-05-19 DIAGNOSIS — G47.00 INSOMNIA, UNSPECIFIED TYPE: ICD-10-CM

## 2023-05-19 DIAGNOSIS — F90.9 ATTENTION DEFICIT HYPERACTIVITY DISORDER (ADHD), UNSPECIFIED ADHD TYPE: ICD-10-CM

## 2023-05-19 PROCEDURE — 3078F PR MOST RECENT DIASTOLIC BLOOD PRESSURE < 80 MM HG: ICD-10-PCS | Mod: CPTII,,, | Performed by: NURSE PRACTITIONER

## 2023-05-19 PROCEDURE — 1159F PR MEDICATION LIST DOCUMENTED IN MEDICAL RECORD: ICD-10-PCS | Mod: CPTII,,, | Performed by: NURSE PRACTITIONER

## 2023-05-19 PROCEDURE — 3074F PR MOST RECENT SYSTOLIC BLOOD PRESSURE < 130 MM HG: ICD-10-PCS | Mod: CPTII,,, | Performed by: NURSE PRACTITIONER

## 2023-05-19 PROCEDURE — 99214 PR OFFICE/OUTPT VISIT, EST, LEVL IV, 30-39 MIN: ICD-10-PCS | Mod: SA,HB,, | Performed by: NURSE PRACTITIONER

## 2023-05-19 PROCEDURE — 1160F RVW MEDS BY RX/DR IN RCRD: CPT | Mod: CPTII,,, | Performed by: NURSE PRACTITIONER

## 2023-05-19 PROCEDURE — 99214 OFFICE O/P EST MOD 30 MIN: CPT | Mod: SA,HB,, | Performed by: NURSE PRACTITIONER

## 2023-05-19 PROCEDURE — 3008F BODY MASS INDEX DOCD: CPT | Mod: CPTII,,, | Performed by: NURSE PRACTITIONER

## 2023-05-19 PROCEDURE — 3074F SYST BP LT 130 MM HG: CPT | Mod: CPTII,,, | Performed by: NURSE PRACTITIONER

## 2023-05-19 PROCEDURE — 3008F PR BODY MASS INDEX (BMI) DOCUMENTED: ICD-10-PCS | Mod: CPTII,,, | Performed by: NURSE PRACTITIONER

## 2023-05-19 PROCEDURE — 3078F DIAST BP <80 MM HG: CPT | Mod: CPTII,,, | Performed by: NURSE PRACTITIONER

## 2023-05-19 PROCEDURE — 1159F MED LIST DOCD IN RCRD: CPT | Mod: CPTII,,, | Performed by: NURSE PRACTITIONER

## 2023-05-19 PROCEDURE — 1160F PR REVIEW ALL MEDS BY PRESCRIBER/CLIN PHARMACIST DOCUMENTED: ICD-10-PCS | Mod: CPTII,,, | Performed by: NURSE PRACTITIONER

## 2023-05-19 RX ORDER — ESCITALOPRAM OXALATE 20 MG/1
20 TABLET ORAL DAILY
Qty: 30 TABLET | Refills: 1 | Status: SHIPPED | OUTPATIENT
Start: 2023-05-19 | End: 2023-07-24

## 2023-05-19 RX ORDER — HYDROXYZINE HYDROCHLORIDE 10 MG/1
10 TABLET, FILM COATED ORAL NIGHTLY
Qty: 30 TABLET | Refills: 1 | Status: SHIPPED | OUTPATIENT
Start: 2023-05-19 | End: 2023-07-24 | Stop reason: SDUPTHER

## 2023-05-19 NOTE — PROGRESS NOTES
"  PSYCHIATRIC FOLLOW-UP VISIT NOTE    Chief Complaint   Patient presents with    Mood     "I have no complaints."         History of Present Illness  38 y.o. year old White female with hx of MDD, COLUMBA, panic attacks, ADHD, insomnia, and tobacco seen today for follow-up appointment and medication management.  Reports she is doing very well on current medication regimen.  Denies any recent depression.  No anhedonia, low motivation, low energy, appetite changes, feelings of shame or guilt, isolative behaviors, or thoughts of self-harm.  Anxiety also well-controlled with current medication regimen.  Denies any recent panic attacks, excessive worry, restlessness, difficulty relaxing, or physical signs and symptoms of anxiety.  She is sleeping well at night and feels rested in the morning.  Focus and concentration are good.  No issues with task completion or executive function.  She also denies any side effects from current medication regimen. Patient denies SI/HI. Denies hallucinations and does not appear to be responding to internal stimuli or be internally preoccupied. No manic symptoms noted.       Objective:     Vitals:  Vitals:    05/19/23 1005   BP: 102/74   BP Location: Left arm   Patient Position: Sitting   Pulse: 101   Temp: 97.5 °F (36.4 °C)   TempSrc: Temporal   SpO2: 98%   Weight: 63.5 kg (140 lb)   Height: 5' 2" (1.575 m)       Wt Readings from Last 3 Encounters:   05/19/23 1005 63.5 kg (140 lb)   04/21/23 1139 65.8 kg (145 lb)   03/29/23 1010 66.2 kg (146 lb)         Medication:    Current Outpatient Medications:     DEXCOM G6 SENSOR Zara, 1 application by Implant route every 10 days., Disp: , Rfl:     dextroamphetamine-amphetamine (ADDERALL) 30 mg Tab, Take 1 tablet (30 mg total) by mouth 2 (two) times a day., Disp: 60 tablet, Rfl: 0    [START ON 5/28/2023] dextroamphetamine-amphetamine (ADDERALL) 30 mg Tab, Take 1 tablet (30 mg total) by mouth 2 (two) times a day., Disp: 60 tablet, Rfl: 0    glimepiride " (AMARYL) 4 MG tablet, Take 4 mg by mouth Daily., Disp: , Rfl:     LANTUS SOLOSTAR U-100 INSULIN glargine 100 units/mL SubQ pen, Inject 16 Units into the skin nightly., Disp: , Rfl:     linaCLOtide (LINZESS) 72 mcg Cap capsule, TAKE 1 CAPSULE BY MOUTH ONCE DAILY, Disp: 30 capsule, Rfl: 1    metFORMIN (GLUCOPHAGE-XR) 500 MG ER 24hr tablet, Take 500 mg by mouth 2 (two) times daily., Disp: , Rfl:     MOUNJARO 5 mg/0.5 mL PnIj, Inject 1 pen into the skin once a week., Disp: , Rfl:     pantoprazole (PROTONIX) 40 MG tablet, Take 40 mg by mouth once daily., Disp: , Rfl:     rosuvastatin (CRESTOR) 10 MG tablet, Take 10 mg by mouth every evening., Disp: , Rfl:     valACYclovir (VALTREX) 500 MG tablet, Take 500 mg by mouth once daily., Disp: , Rfl:     EScitalopram oxalate (LEXAPRO) 20 MG tablet, Take 1 tablet (20 mg total) by mouth Daily., Disp: 30 tablet, Rfl: 1    hydrOXYzine HCL (ATARAX) 10 MG Tab, Take 1 tablet (10 mg total) by mouth nightly., Disp: 30 tablet, Rfl: 1       Significant Labs: - none at this time    Significant Imaging: - none at this time    Physical Exam  Vitals and nursing note reviewed.   Constitutional:       General: She is awake.      Appearance: Normal appearance.   Musculoskeletal:      Comments: Full ROM   Neurological:      Mental Status: She is alert.   Psychiatric:         Attention and Perception: Attention and perception normal. She does not perceive auditory or visual hallucinations.         Mood and Affect: Affect normal.         Speech: Speech normal.         Behavior: Behavior is cooperative.         Thought Content: Thought content does not include homicidal or suicidal ideation.         Cognition and Memory: Cognition and memory normal.        Review of Systems     Mental Status Exam:  Presentation:  - Appearance: 38 y.o. year old White female, appears stated age, appears Casually dressed and Well groomed  - Motility: Erect when standing, Steady gait, No EPS or Tremors, No psychomotor  "agitation or retardation appreciated  - Behavior: calm, cooperative, good eye contact  Speech:  - Character/Organization: spontaneous, fluent, normal volume, normal rate, normal rhythm  Emotional State:  - Mood: "happy"   - Affect: congruent and appropriate  Thought:  - Process: logical, linear, organized , goal-directed  - Preoccupations: no ruminations, rituals, or phobias appreciated  - Delusions: no persecutory, paranoid, or grandiose delusions appreciated  - Perception: denies AVH, not actively responding to internal stimuli  - SI/HI: denies/denies  Sensorium & Intellect:  - Sensorium: AAOx4  - Memory: intact to recent and remote events  - Attention/Concentration: good/good  - Insight/Judgement: good/good    Gait: normal swing and stance  MSK:no rigidity appreciated    All other systems without acute issues unless noted in HPI      Assessment/Plan      ICD-10-CM ICD-9-CM    1. Major depressive disorder, recurrent episode, moderate  F33.1 296.32 EScitalopram oxalate (LEXAPRO) 20 MG tablet      2. Generalized anxiety disorder  F41.1 300.02       3. Panic attack  F41.0 300.01       4. Attention deficit hyperactivity disorder (ADHD), unspecified ADHD type  F90.9 314.01       5. Insomnia, unspecified type  G47.00 780.52 hydrOXYzine HCL (ATARAX) 10 MG Tab      6. Tobacco user  Z72.0 305.1          Continue current medications without change    Potential side effects and risks vs benefits of current treatment plan reviewed with patient. Applicable black box warnings reviewed. Encouraged patient not to alter dosages or abruptly discontinue medications without contacting prescriber first, due to risk of worsening symptoms and decompensation of mental status. Warned of risks associated with herbal remedies and supplements while taking psychotropic medications and of the need to consult prescriber prior to adding any of these to current regimen. Patient should abstain from abuse of alcohol, prescription medications, and " illicit drugs. Reviewed when to contact clinic and/or seek emergent care, such as but not limited to, onset/worsening SI/HI, hallucinations, delusions, manic symptoms. Pt verbalized understanding and agreement of these warnings/recommendations and verbally consented to treatment plan.    Encouraged smoking cessation and reinforced negative effects of continued use. Assistance with smoking cessation was offered, including medications, counseling, printed information, and referral to smoking cessation program. Encouraged patient to visit [email or web address] for further information and resources.      Follow up in about 2 months (around 7/19/2023) for Medication Management.    Yefri Medrano, LUCHOP

## 2023-05-25 ENCOUNTER — LAB VISIT (OUTPATIENT)
Dept: LAB | Facility: HOSPITAL | Age: 38
End: 2023-05-25
Attending: INTERNAL MEDICINE
Payer: MEDICAID

## 2023-05-25 DIAGNOSIS — E78.00 PURE HYPERCHOLESTEROLEMIA: ICD-10-CM

## 2023-05-25 DIAGNOSIS — E11.00 TYPE II DIABETES MELLITUS WITH HYPEROSMOLARITY, UNCONTROLLED: Primary | ICD-10-CM

## 2023-05-25 LAB
ALBUMIN SERPL-MCNC: 4.8 G/DL (ref 3.4–5)
ALBUMIN/GLOB SERPL: 1.8 RATIO
ALP SERPL-CCNC: 62 UNIT/L (ref 50–144)
ALT SERPL-CCNC: 36 UNIT/L (ref 1–45)
ANION GAP SERPL CALC-SCNC: 7 MEQ/L (ref 2–13)
AST SERPL-CCNC: 36 UNIT/L (ref 14–36)
BILIRUBIN DIRECT+TOT PNL SERPL-MCNC: 0.3 MG/DL (ref 0–1)
BUN SERPL-MCNC: 14 MG/DL (ref 7–20)
CALCIUM SERPL-MCNC: 9 MG/DL (ref 8.4–10.2)
CHLORIDE SERPL-SCNC: 104 MMOL/L (ref 98–110)
CHOLEST SERPL-MCNC: 135 MG/DL (ref 0–200)
CO2 SERPL-SCNC: 27 MMOL/L (ref 21–32)
CREAT SERPL-MCNC: 0.8 MG/DL (ref 0.66–1.25)
CREAT/UREA NIT SERPL: 18 (ref 12–20)
EST. AVERAGE GLUCOSE BLD GHB EST-MCNC: 128.4 MG/DL (ref 70–115)
GFR SERPLBLD CREATININE-BSD FMLA CKD-EPI: >90 MLS/MIN/1.73/M2
GLOBULIN SER-MCNC: 2.7 GM/DL (ref 2–3.9)
GLUCOSE SERPL-MCNC: 141 MG/DL (ref 70–115)
HBA1C MFR BLD: 6.1 % (ref 4–6)
HDLC SERPL-MCNC: 62 MG/DL (ref 40–60)
LDLC SERPL DIRECT ASSAY-SCNC: 56.5 MG/DL (ref 30–100)
POTASSIUM SERPL-SCNC: 4.7 MMOL/L (ref 3.5–5.1)
PROT SERPL-MCNC: 7.5 GM/DL (ref 6.3–8.2)
SODIUM SERPL-SCNC: 138 MMOL/L (ref 135–145)
TRIGL SERPL-MCNC: 65 MG/DL (ref 30–200)

## 2023-05-25 PROCEDURE — 83036 HEMOGLOBIN GLYCOSYLATED A1C: CPT

## 2023-05-25 PROCEDURE — 80061 LIPID PANEL: CPT

## 2023-05-25 PROCEDURE — 36415 COLL VENOUS BLD VENIPUNCTURE: CPT

## 2023-05-25 PROCEDURE — 80053 COMPREHEN METABOLIC PANEL: CPT

## 2023-07-23 DIAGNOSIS — G47.00 INSOMNIA, UNSPECIFIED TYPE: ICD-10-CM

## 2023-07-23 DIAGNOSIS — F90.9 ATTENTION DEFICIT HYPERACTIVITY DISORDER (ADHD), UNSPECIFIED ADHD TYPE: ICD-10-CM

## 2023-07-23 DIAGNOSIS — F33.1 MAJOR DEPRESSIVE DISORDER, RECURRENT EPISODE, MODERATE: ICD-10-CM

## 2023-07-24 RX ORDER — HYDROXYZINE HYDROCHLORIDE 10 MG/1
10 TABLET, FILM COATED ORAL NIGHTLY
Qty: 30 TABLET | Refills: 1 | Status: SHIPPED | OUTPATIENT
Start: 2023-07-24 | End: 2023-08-17

## 2023-07-24 RX ORDER — ESCITALOPRAM OXALATE 20 MG/1
TABLET ORAL
Qty: 30 TABLET | Refills: 1 | Status: SHIPPED | OUTPATIENT
Start: 2023-07-24 | End: 2023-08-17 | Stop reason: SDUPTHER

## 2023-07-24 RX ORDER — DEXTROAMPHETAMINE SACCHARATE, AMPHETAMINE ASPARTATE, DEXTROAMPHETAMINE SULFATE AND AMPHETAMINE SULFATE 7.5; 7.5; 7.5; 7.5 MG/1; MG/1; MG/1; MG/1
30 TABLET ORAL 2 TIMES DAILY
Qty: 60 TABLET | Refills: 0 | Status: SHIPPED | OUTPATIENT
Start: 2023-07-24 | End: 2023-08-17 | Stop reason: SDUPTHER

## 2023-08-17 ENCOUNTER — PATIENT MESSAGE (OUTPATIENT)
Dept: BEHAVIORAL HEALTH | Facility: CLINIC | Age: 38
End: 2023-08-17
Payer: MEDICAID

## 2023-08-17 ENCOUNTER — OFFICE VISIT (OUTPATIENT)
Dept: BEHAVIORAL HEALTH | Facility: CLINIC | Age: 38
End: 2023-08-17
Payer: MEDICAID

## 2023-08-17 DIAGNOSIS — F33.1 MAJOR DEPRESSIVE DISORDER, RECURRENT EPISODE, MODERATE: Primary | ICD-10-CM

## 2023-08-17 DIAGNOSIS — F41.0 PANIC ATTACK: ICD-10-CM

## 2023-08-17 DIAGNOSIS — G47.00 INSOMNIA, UNSPECIFIED TYPE: ICD-10-CM

## 2023-08-17 DIAGNOSIS — F41.1 GENERALIZED ANXIETY DISORDER: ICD-10-CM

## 2023-08-17 DIAGNOSIS — F90.9 ATTENTION DEFICIT HYPERACTIVITY DISORDER (ADHD), UNSPECIFIED ADHD TYPE: ICD-10-CM

## 2023-08-17 DIAGNOSIS — Z72.0 TOBACCO USER: ICD-10-CM

## 2023-08-17 PROCEDURE — 3044F HG A1C LEVEL LT 7.0%: CPT | Mod: S$PBB,CPTII,NDTC, | Performed by: NURSE PRACTITIONER

## 2023-08-17 PROCEDURE — 1159F MED LIST DOCD IN RCRD: CPT | Mod: S$PBB,CPTII,NDTC, | Performed by: NURSE PRACTITIONER

## 2023-08-17 PROCEDURE — 1160F PR REVIEW ALL MEDS BY PRESCRIBER/CLIN PHARMACIST DOCUMENTED: ICD-10-PCS | Mod: S$PBB,CPTII,NDTC, | Performed by: NURSE PRACTITIONER

## 2023-08-17 PROCEDURE — 1160F RVW MEDS BY RX/DR IN RCRD: CPT | Mod: S$PBB,CPTII,NDTC, | Performed by: NURSE PRACTITIONER

## 2023-08-17 PROCEDURE — 3044F PR MOST RECENT HEMOGLOBIN A1C LEVEL <7.0%: ICD-10-PCS | Mod: S$PBB,CPTII,NDTC, | Performed by: NURSE PRACTITIONER

## 2023-08-17 PROCEDURE — 1159F PR MEDICATION LIST DOCUMENTED IN MEDICAL RECORD: ICD-10-PCS | Mod: S$PBB,CPTII,NDTC, | Performed by: NURSE PRACTITIONER

## 2023-08-17 PROCEDURE — 99214 PR OFFICE/OUTPT VISIT, EST, LEVL IV, 30-39 MIN: ICD-10-PCS | Mod: S$PBB,SA,HB,NDTC | Performed by: NURSE PRACTITIONER

## 2023-08-17 PROCEDURE — 99214 OFFICE O/P EST MOD 30 MIN: CPT | Mod: S$PBB,SA,HB,NDTC | Performed by: NURSE PRACTITIONER

## 2023-08-17 RX ORDER — INSULIN LISPRO 100 [IU]/ML
6 INJECTION, SOLUTION INTRAVENOUS; SUBCUTANEOUS DAILY
COMMUNITY
Start: 2020-08-17

## 2023-08-17 RX ORDER — DEXTROAMPHETAMINE SACCHARATE, AMPHETAMINE ASPARTATE, DEXTROAMPHETAMINE SULFATE AND AMPHETAMINE SULFATE 7.5; 7.5; 7.5; 7.5 MG/1; MG/1; MG/1; MG/1
30 TABLET ORAL 2 TIMES DAILY
Qty: 60 TABLET | Refills: 0 | Status: SHIPPED | OUTPATIENT
Start: 2023-08-17 | End: 2023-11-17 | Stop reason: SDUPTHER

## 2023-08-17 RX ORDER — PEDI MV NO.227/FERROUS SULFATE 10 MG
2 TABLET,CHEWABLE ORAL DAILY
COMMUNITY
Start: 2023-08-03 | End: 2023-11-17

## 2023-08-17 RX ORDER — BLOOD-GLUCOSE TRANSMITTER
EACH MISCELLANEOUS
COMMUNITY
Start: 2023-07-20 | End: 2023-08-17 | Stop reason: SDUPTHER

## 2023-08-17 RX ORDER — ESCITALOPRAM OXALATE 20 MG/1
20 TABLET ORAL DAILY
Qty: 30 TABLET | Refills: 2 | Status: SHIPPED | OUTPATIENT
Start: 2023-08-17 | End: 2023-11-17 | Stop reason: SDUPTHER

## 2023-08-17 NOTE — PROGRESS NOTES
"PSYCHIATRIC FOLLOW-UP VISIT NOTE    Chief Complaint   Patient presents with    3 month follow up     "I have no complaints"         History of Present Illness  38 y.o. year old White female with hx of MDD, panic attacks, nomi, ADHD, insomnia, and tobacco use seen today for follow-up appointment and medication management.  Patient reports she is been doing very well since last visit.  Denies any recent depression and describes mood as happy most days of the week.  She and her family were able to take a vacation to a water park in [place] over the summer and she enjoyed the time away from home with her children.  Denies any recent anxiety issues.  She is able to relax easily in his not easily overwhelmed.  Good focus and concentration.  No issues with task completion or executive function.  She is sleeping well at night and feels rested in the morning.  Denies any recent panic attacks.  No side effects from current medication regimen. Patient denies SI/HI. Denies hallucinations and does not appear to be responding to internal stimuli or be internally preoccupied. No manic symptoms noted.         Objective:     Vitals:  There were no vitals filed for this visit.    Wt Readings from Last 3 Encounters:   05/19/23 1005 63.5 kg (140 lb)   04/21/23 1139 65.8 kg (145 lb)   03/29/23 1010 66.2 kg (146 lb)         Medication:    Current Outpatient Medications:     DEXCOM G6 SENSOR Zara, 1 application by Implant route every 10 days., Disp: , Rfl:     glimepiride (AMARYL) 4 MG tablet, Take 4 mg by mouth Daily., Disp: , Rfl:     insulin lispro (HUMALOG KWIKPEN INSULIN) 100 unit/mL pen, Inject 6 Units into the skin once daily., Disp: , Rfl:     LANTUS SOLOSTAR U-100 INSULIN glargine 100 units/mL SubQ pen, Inject 16 Units into the skin nightly., Disp: , Rfl:     metFORMIN (GLUCOPHAGE-XR) 500 MG ER 24hr tablet, Take 500 mg by mouth 2 (two) times daily., Disp: , Rfl:     MOUNJARO 5 mg/0.5 mL PnIj, Inject 1 pen into the skin once a week., " "Disp: , Rfl:     pedi mv no.227-ferrous sulfate (FLINTSTONES COMPLETE, FE SULF,) 10 mg iron Chew, Take 2 tablets by mouth once daily., Disp: , Rfl:     rosuvastatin (CRESTOR) 10 MG tablet, Take 10 mg by mouth every evening., Disp: , Rfl:     valACYclovir (VALTREX) 500 MG tablet, Take 500 mg by mouth once daily., Disp: , Rfl:     dextroamphetamine-amphetamine (ADDERALL) 30 mg Tab, Take 1 tablet (30 mg total) by mouth 2 (two) times a day., Disp: 60 tablet, Rfl: 0    EScitalopram oxalate (LEXAPRO) 20 MG tablet, Take 1 tablet (20 mg total) by mouth once daily., Disp: 30 tablet, Rfl: 2       Significant Labs: - none at this time    Significant Imaging: - none at this time    Physical Exam  Vitals and nursing note reviewed.   Constitutional:       General: She is awake.      Appearance: Normal appearance.   Musculoskeletal:      Comments: Full ROM   Neurological:      Mental Status: She is alert.   Psychiatric:         Attention and Perception: Attention and perception normal. She does not perceive auditory or visual hallucinations.         Mood and Affect: Affect normal.         Speech: Speech normal.         Behavior: Behavior is cooperative.         Thought Content: Thought content does not include homicidal or suicidal ideation.         Cognition and Memory: Cognition and memory normal.          Review of Systems     Mental Status Exam:  Presentation:  - Appearance: 38 y.o. year old White female, appears stated age, appears Casually dressed and Well groomed  - Motility: Erect when standing, Steady gait, No EPS or Tremors, No psychomotor agitation or retardation appreciated  - Behavior: calm, cooperative, good eye contact  Speech:  - Character/Organization: spontaneous, fluent, normal volume, normal rate, normal rhythm  Emotional State:  - Mood: "Happy"   - Affect: congruent and appropriate  Thought:  - Process: logical, linear, organized , goal-directed  - Preoccupations: no ruminations, rituals, or phobias " appreciated  - Delusions: no persecutory, paranoid, or grandiose delusions appreciated  - Perception: denies AVH, not actively responding to internal stimuli  - SI/HI: denies/denies  Sensorium & Intellect:  - Sensorium: AAOx4  - Memory: intact to recent and remote events  - Attention/Concentration: good/good  - Insight/Judgement: good/good    Gait: normal swing and stance  MSK:no rigidity appreciated    All other systems without acute issues unless noted in HPI      Assessment/Plan      ICD-10-CM ICD-9-CM    1. Major depressive disorder, recurrent episode, moderate  F33.1 296.32 EScitalopram oxalate (LEXAPRO) 20 MG tablet      2. Generalized anxiety disorder  F41.1 300.02       3. Attention deficit hyperactivity disorder (ADHD), unspecified ADHD type  F90.9 314.01 dextroamphetamine-amphetamine (ADDERALL) 30 mg Tab      4. Panic attack  F41.0 300.01       5. Insomnia, unspecified type  G47.00 780.52       6. Tobacco user  Z72.0 305.1          Continue current medications without change    Potential side effects and risks vs benefits of current treatment plan reviewed with patient. Applicable black box warnings reviewed. Encouraged patient not to alter dosages or abruptly discontinue medications without contacting prescriber first, due to risk of worsening symptoms and decompensation of mental status. Warned of risks associated with herbal remedies and supplements while taking psychotropic medications and of the need to consult prescriber prior to adding any of these to current regimen. Patient should abstain from abuse of alcohol, prescription medications, and illicit drugs. Reviewed when to contact clinic and/or seek emergent care, such as but not limited to, onset/worsening SI/HI, hallucinations, delusions, manic symptoms. Pt verbalized understanding and agreement of these warnings/recommendations and verbally consented to treatment plan.     checked. Results as expected. No suspected diversion or abuse of  controlled substances.      Follow up in about 3 months (around 11/17/2023) for Medication Management.    Yefri Medrano, LUCHOP

## 2023-09-13 NOTE — PROGRESS NOTES
Chief Complaint: Annual exam    Chief Complaint   Patient presents with    Well Woman     Annual Gyn exam LP;22 neg w/ neg HPV       HPI:   Nahomi Warren is a 38 y.o. year old  here for her Annual Exam. Reports Type 1 diabetes controlled with Monujaro.  Previously reported symptoms have resolved with control of blood sugars.  Reports occ left sided pelvic pain at time of cycle, but otherwise doing well.     Gyn hx:  LMP: 23  Frequency: monthly  Cycle Length: 3-4 days   Flow: normal  Intermenstrual Bleeding: No  Postcoital Bleeding: No  Dysmenorrhea: Yes, severe some days  Sexually Active: yes  Dyspareunia: No  Contraception: tubal ligation  H/o STI: HSV, HPV  Last pap: 22 neg w/ neg HPV  H/o abnl pap: Yes  due to HPV  Colposcopy: yes and Cyrotherapy  Gardasil: 0/3  MMG: never had one  H/o abnl MMG: n/a  Colonoscopy: never had one    Past Medical History:   Diagnosis Date    ADHD (attention deficit hyperactivity disorder)     Anemia     Anxiety     Depression     Diabetes mellitus     Elevated liver enzymes     Insomnia     Panic attack      Past Surgical History:   Procedure Laterality Date    CERVIX LESION DESTRUCTION      TUBAL LIGATION Bilateral 10/28/2016       Current Outpatient Medications:     DEXCOM G6 SENSOR Zara, 1 application by Implant route every 10 days., Disp: , Rfl:     EScitalopram oxalate (LEXAPRO) 20 MG tablet, Take 1 tablet (20 mg total) by mouth once daily., Disp: 30 tablet, Rfl: 2    insulin lispro (HUMALOG KWIKPEN INSULIN) 100 unit/mL pen, Inject 6 Units into the skin once daily., Disp: , Rfl:     LANTUS SOLOSTAR U-100 INSULIN glargine 100 units/mL SubQ pen, Inject 16 Units into the skin nightly., Disp: , Rfl:     metFORMIN (GLUCOPHAGE-XR) 500 MG ER 24hr tablet, Take 500 mg by mouth 2 (two) times daily., Disp: , Rfl:     MOUNJARO 2.5 mg/0.5 mL PnIj, Inject into the skin., Disp: , Rfl:     pedi  no.227-ferrous sulfate (FLINTSTONES COMPLETE, FE SULF,) 10 mg iron  Chew, Take 2 tablets by mouth once daily., Disp: , Rfl:     rosuvastatin (CRESTOR) 10 MG tablet, Take 10 mg by mouth every evening., Disp: , Rfl:     valACYclovir (VALTREX) 500 MG tablet, Take 500 mg by mouth once daily., Disp: , Rfl:     dextroamphetamine-amphetamine (ADDERALL) 30 mg Tab, Take 1 tablet (30 mg total) by mouth 2 (two) times a day., Disp: 60 tablet, Rfl: 0  Review of patient's allergies indicates:   Allergen Reactions    Sulfa (sulfonamide antibiotics) Other (See Comments)     unknown     OB History    Para Term  AB Living   2 2 1 1   1   SAB IAB Ectopic Multiple Live Births           1      # Outcome Date GA Lbr Raymon/2nd Weight Sex Delivery Anes PTL Lv   2  11 [id number]  2.863 kg (6 lb 5 oz) M Vag-Spont EPI Y    1 Term 04   3.232 kg (7 lb 2 oz) M Vag-Spont EPI  MEGHAN     Social History     Tobacco Use    Smoking status: Former     Types: Cigarettes, Vaping with nicotine    Smokeless tobacco: Never   Substance Use Topics    Alcohol use: Yes     Comment: monthly    Drug use: Never     Family History   Problem Relation Age of Onset    No Known Problems Paternal Grandfather     No Known Problems Paternal Grandmother     Breast cancer Maternal Grandmother     No Known Problems Maternal Grandfather     No Known Problems Father     Anxiety disorder Mother     Depression Mother     No Known Problems Brother     Schizophrenia Sister     Personality disorder Sister     Depression Sister     Rashes / Skin problems Sister         Psoriasis    Seizures Sister     No Known Problems Maternal Aunt     No Known Problems Maternal Uncle     No Known Problems Paternal Aunt     No Known Problems Paternal Uncle     No Known Problems Cousin     No Known Problems Other     Colon cancer Neg Hx     Ovarian cancer Neg Hx     Uterine cancer Neg Hx     Cervical cancer Neg Hx        Review of Systems:   Review of Systems   Constitutional:  Negative for appetite change, chills, fatigue, fever and  "unexpected weight change.   Eyes:  Negative for visual disturbance.   Respiratory:  Negative for cough, shortness of breath and wheezing.    Cardiovascular:  Negative for chest pain, palpitations and leg swelling.   Gastrointestinal:  Negative for abdominal pain, bloating, blood in stool, constipation, diarrhea, nausea, vomiting, reflux and fecal incontinence.   Endocrine: Negative for hair loss and hot flashes.   Genitourinary:  Negative for bladder incontinence, decreased libido, dysmenorrhea, dyspareunia, dysuria, flank pain, frequency, genital sores, hematuria, hot flashes, menorrhagia, menstrual problem, pelvic pain, urgency, vaginal bleeding, vaginal discharge, vaginal pain, urinary incontinence, postcoital bleeding, postmenopausal bleeding, vaginal dryness and vaginal odor.   Integumentary:  Negative for rash, acne, hair changes, breast mass, nipple discharge, breast skin changes and breast tenderness.   Neurological:  Negative for headaches.   Psychiatric/Behavioral:  Negative for depression.    Breast: Negative for asymmetry, breast self exam, lump, mass, mastodynia, nipple discharge, skin changes and tenderness       Physical Exam:  /72 (BP Location: Right arm, Patient Position: Sitting, BP Method: Medium (Manual))   Temp 98.1 °F (36.7 °C) (Temporal)   Ht 5' 2" (1.575 m)   Wt 57.8 kg (127 lb 6.8 oz)   LMP 09/06/2023 (Exact Date)   BMI 23.31 kg/m²       Physical Exam:   Constitutional: She is oriented to person, place, and time. She appears well-developed and well-nourished.    HENT:   Head: Normocephalic.      Cardiovascular:       Exam reveals no edema.        Pulmonary/Chest: Effort normal. She exhibits no mass, no tenderness, no bony tenderness, no deformity and no retraction. Right breast exhibits no inverted nipple, no mass, no nipple discharge, no skin change, no tenderness, no bleeding, no swelling, no mastectomy, no augmentation and no lumpectomy. Left breast exhibits no inverted nipple, " no mass, no nipple discharge, no skin change, no tenderness, no bleeding, no swelling, no mastectomy, no augmentation and no lumpectomy. Breasts are symmetrical.        Abdominal: Soft. She exhibits no distension and no mass. There is no abdominal tenderness. There is no rebound and no guarding. No hernia. Hernia confirmed negative in the right inguinal area.     Genitourinary:    Inguinal canal, vagina, uterus, right adnexa, left adnexa and rectum normal.   Rectum:      No anal fissure or external hemorrhoid.   The external female genitalia was normal.   No external genitalia lesions identified,Genitalia hair distrobution normal .   Labial bartholins normal.There is no rash, tenderness, lesion or injury on the right labia. There is no rash, tenderness, lesion or injury on the left labia. Cervix is normal. No no masses or organomegaly. Right adnexum displays no mass, no tenderness and no fullness. Left adnexum displays no mass, no tenderness and no fullness. Vagina exhibits no lesion. No erythema,  no vaginal discharge, tenderness, bleeding, rectocele, cystocele or unspecified prolapse of vaginal walls in the vagina.    No foreign body in the vagina.      No signs of injury in the vagina.   Vagina was moist.Cervix exhibits no motion tenderness, no lesion, no discharge, no friability, no lesion, no tenderness and no polyp. Uterus consistancy normal. and Uerus contour normal  Uterus is not deviated, not enlarged, not fixed, not tender, not hosting fibroids and no uterine prolapse. Normal urethral meatus.Urethral Meatus exhibits: urethral lesion and prolapsedUrethra findings: no urethral mass and no tendernessBladder findings: no bladder tenderness          Musculoskeletal: Normal range of motion.      Lymphadenopathy: No inguinal adenopathy noted on the right or left side.    Neurological: She is alert and oriented to person, place, and time.    Skin: Skin is warm and dry.    Psychiatric: She has a normal mood and  affect. Her behavior is normal. Judgment and thought content normal.        Assessment:   Annual Well Women Exam  1. Well woman exam with routine gynecological exam  - Liquid-Based Pap Smear, Screening Screening    2. Screening for malignant neoplasm of the cervix  - Liquid-Based Pap Smear, Screening Screening    3. Type 2 diabetes mellitus without complication, without long-term current use of insulin    4. Dysmenorrhea        Plan:  Pap Done  Breast Self-awareness  Recommend exercise at least 3 times weekly  Healthy, balanced diet  Keep yearly follow up with PCP  Normal exam today.   Recommend ibuprofen 600mg q 6 hours as needed during menses.     RTC PRN/ANNUAL

## 2023-09-19 ENCOUNTER — OFFICE VISIT (OUTPATIENT)
Dept: OBSTETRICS AND GYNECOLOGY | Facility: CLINIC | Age: 38
End: 2023-09-19
Payer: MEDICAID

## 2023-09-19 VITALS
WEIGHT: 127.44 LBS | SYSTOLIC BLOOD PRESSURE: 110 MMHG | DIASTOLIC BLOOD PRESSURE: 72 MMHG | HEIGHT: 62 IN | BODY MASS INDEX: 23.45 KG/M2 | TEMPERATURE: 98 F

## 2023-09-19 DIAGNOSIS — E11.9 TYPE 2 DIABETES MELLITUS WITHOUT COMPLICATION, WITHOUT LONG-TERM CURRENT USE OF INSULIN: ICD-10-CM

## 2023-09-19 DIAGNOSIS — N94.6 DYSMENORRHEA: ICD-10-CM

## 2023-09-19 DIAGNOSIS — Z01.419 WELL WOMAN EXAM WITH ROUTINE GYNECOLOGICAL EXAM: Primary | ICD-10-CM

## 2023-09-19 DIAGNOSIS — Z12.4 SCREENING FOR MALIGNANT NEOPLASM OF THE CERVIX: ICD-10-CM

## 2023-09-19 PROCEDURE — 99395 PR PREVENTIVE VISIT,EST,18-39: ICD-10-PCS | Mod: ,,, | Performed by: OBSTETRICS & GYNECOLOGY

## 2023-09-19 PROCEDURE — 3044F HG A1C LEVEL LT 7.0%: CPT | Mod: CPTII,,, | Performed by: OBSTETRICS & GYNECOLOGY

## 2023-09-19 PROCEDURE — 3074F SYST BP LT 130 MM HG: CPT | Mod: CPTII,,, | Performed by: OBSTETRICS & GYNECOLOGY

## 2023-09-19 PROCEDURE — 1159F MED LIST DOCD IN RCRD: CPT | Mod: CPTII,,, | Performed by: OBSTETRICS & GYNECOLOGY

## 2023-09-19 PROCEDURE — 3044F PR MOST RECENT HEMOGLOBIN A1C LEVEL <7.0%: ICD-10-PCS | Mod: CPTII,,, | Performed by: OBSTETRICS & GYNECOLOGY

## 2023-09-19 PROCEDURE — 3008F PR BODY MASS INDEX (BMI) DOCUMENTED: ICD-10-PCS | Mod: CPTII,,, | Performed by: OBSTETRICS & GYNECOLOGY

## 2023-09-19 PROCEDURE — 88174 CYTOPATH C/V AUTO IN FLUID: CPT | Performed by: OBSTETRICS & GYNECOLOGY

## 2023-09-19 PROCEDURE — 3074F PR MOST RECENT SYSTOLIC BLOOD PRESSURE < 130 MM HG: ICD-10-PCS | Mod: CPTII,,, | Performed by: OBSTETRICS & GYNECOLOGY

## 2023-09-19 PROCEDURE — 99395 PREV VISIT EST AGE 18-39: CPT | Mod: ,,, | Performed by: OBSTETRICS & GYNECOLOGY

## 2023-09-19 PROCEDURE — 3078F PR MOST RECENT DIASTOLIC BLOOD PRESSURE < 80 MM HG: ICD-10-PCS | Mod: CPTII,,, | Performed by: OBSTETRICS & GYNECOLOGY

## 2023-09-19 PROCEDURE — 87624 HPV HI-RISK TYP POOLED RSLT: CPT

## 2023-09-19 PROCEDURE — 1159F PR MEDICATION LIST DOCUMENTED IN MEDICAL RECORD: ICD-10-PCS | Mod: CPTII,,, | Performed by: OBSTETRICS & GYNECOLOGY

## 2023-09-19 PROCEDURE — 3008F BODY MASS INDEX DOCD: CPT | Mod: CPTII,,, | Performed by: OBSTETRICS & GYNECOLOGY

## 2023-09-19 PROCEDURE — 3078F DIAST BP <80 MM HG: CPT | Mod: CPTII,,, | Performed by: OBSTETRICS & GYNECOLOGY

## 2023-09-19 RX ORDER — TIRZEPATIDE 2.5 MG/.5ML
INJECTION, SOLUTION SUBCUTANEOUS
COMMUNITY
Start: 2023-08-23

## 2023-09-21 PROBLEM — N94.6 DYSMENORRHEA: Status: ACTIVE | Noted: 2023-09-21

## 2023-09-21 PROBLEM — E11.9 DIABETES MELLITUS: Status: ACTIVE | Noted: 2023-09-21

## 2023-09-21 LAB — PSYCHE PATHOLOGY RESULT: NORMAL

## 2023-09-25 DIAGNOSIS — B00.9 HSV-1 (HERPES SIMPLEX VIRUS 1) INFECTION: Primary | ICD-10-CM

## 2023-09-25 RX ORDER — VALACYCLOVIR HYDROCHLORIDE 500 MG/1
500 TABLET, FILM COATED ORAL
Qty: 30 TABLET | Refills: 6 | Status: SHIPPED | OUTPATIENT
Start: 2023-09-25

## 2023-11-17 ENCOUNTER — OFFICE VISIT (OUTPATIENT)
Dept: BEHAVIORAL HEALTH | Facility: CLINIC | Age: 38
End: 2023-11-17
Payer: MEDICAID

## 2023-11-17 VITALS
BODY MASS INDEX: 23.9 KG/M2 | DIASTOLIC BLOOD PRESSURE: 78 MMHG | HEART RATE: 81 BPM | SYSTOLIC BLOOD PRESSURE: 116 MMHG | HEIGHT: 62 IN | OXYGEN SATURATION: 99 % | WEIGHT: 129.88 LBS | TEMPERATURE: 98 F

## 2023-11-17 DIAGNOSIS — F90.9 ATTENTION DEFICIT HYPERACTIVITY DISORDER (ADHD), UNSPECIFIED ADHD TYPE: ICD-10-CM

## 2023-11-17 DIAGNOSIS — F33.1 MAJOR DEPRESSIVE DISORDER, RECURRENT EPISODE, MODERATE: Primary | ICD-10-CM

## 2023-11-17 DIAGNOSIS — F41.0 PANIC ATTACK: ICD-10-CM

## 2023-11-17 DIAGNOSIS — Z72.0 TOBACCO USER: ICD-10-CM

## 2023-11-17 DIAGNOSIS — G47.00 INSOMNIA, UNSPECIFIED TYPE: ICD-10-CM

## 2023-11-17 DIAGNOSIS — F41.1 GENERALIZED ANXIETY DISORDER: ICD-10-CM

## 2023-11-17 PROCEDURE — 3074F SYST BP LT 130 MM HG: CPT | Mod: CPTII,,, | Performed by: NURSE PRACTITIONER

## 2023-11-17 PROCEDURE — 99214 OFFICE O/P EST MOD 30 MIN: CPT | Mod: SA,HB,, | Performed by: NURSE PRACTITIONER

## 2023-11-17 PROCEDURE — 3078F PR MOST RECENT DIASTOLIC BLOOD PRESSURE < 80 MM HG: ICD-10-PCS | Mod: CPTII,,, | Performed by: NURSE PRACTITIONER

## 2023-11-17 PROCEDURE — 3044F PR MOST RECENT HEMOGLOBIN A1C LEVEL <7.0%: ICD-10-PCS | Mod: CPTII,,, | Performed by: NURSE PRACTITIONER

## 2023-11-17 PROCEDURE — 1160F RVW MEDS BY RX/DR IN RCRD: CPT | Mod: CPTII,,, | Performed by: NURSE PRACTITIONER

## 2023-11-17 PROCEDURE — 3074F PR MOST RECENT SYSTOLIC BLOOD PRESSURE < 130 MM HG: ICD-10-PCS | Mod: CPTII,,, | Performed by: NURSE PRACTITIONER

## 2023-11-17 PROCEDURE — 3008F BODY MASS INDEX DOCD: CPT | Mod: CPTII,,, | Performed by: NURSE PRACTITIONER

## 2023-11-17 PROCEDURE — 3078F DIAST BP <80 MM HG: CPT | Mod: CPTII,,, | Performed by: NURSE PRACTITIONER

## 2023-11-17 PROCEDURE — 3008F PR BODY MASS INDEX (BMI) DOCUMENTED: ICD-10-PCS | Mod: CPTII,,, | Performed by: NURSE PRACTITIONER

## 2023-11-17 PROCEDURE — 99214 PR OFFICE/OUTPT VISIT, EST, LEVL IV, 30-39 MIN: ICD-10-PCS | Mod: SA,HB,, | Performed by: NURSE PRACTITIONER

## 2023-11-17 PROCEDURE — 3044F HG A1C LEVEL LT 7.0%: CPT | Mod: CPTII,,, | Performed by: NURSE PRACTITIONER

## 2023-11-17 PROCEDURE — 1159F MED LIST DOCD IN RCRD: CPT | Mod: CPTII,,, | Performed by: NURSE PRACTITIONER

## 2023-11-17 PROCEDURE — 1160F PR REVIEW ALL MEDS BY PRESCRIBER/CLIN PHARMACIST DOCUMENTED: ICD-10-PCS | Mod: CPTII,,, | Performed by: NURSE PRACTITIONER

## 2023-11-17 PROCEDURE — 1159F PR MEDICATION LIST DOCUMENTED IN MEDICAL RECORD: ICD-10-PCS | Mod: CPTII,,, | Performed by: NURSE PRACTITIONER

## 2023-11-17 RX ORDER — ESCITALOPRAM OXALATE 20 MG/1
20 TABLET ORAL DAILY
Qty: 30 TABLET | Refills: 2 | Status: SHIPPED | OUTPATIENT
Start: 2023-11-17 | End: 2024-02-16 | Stop reason: SDUPTHER

## 2023-11-17 RX ORDER — DEXTROAMPHETAMINE SACCHARATE, AMPHETAMINE ASPARTATE, DEXTROAMPHETAMINE SULFATE AND AMPHETAMINE SULFATE 7.5; 7.5; 7.5; 7.5 MG/1; MG/1; MG/1; MG/1
30 TABLET ORAL 2 TIMES DAILY
Qty: 60 TABLET | Refills: 0 | Status: SHIPPED | OUTPATIENT
Start: 2023-11-17 | End: 2024-02-16 | Stop reason: SDUPTHER

## 2023-11-17 RX ORDER — BLOOD-GLUCOSE TRANSMITTER
1 EACH MISCELLANEOUS
COMMUNITY
Start: 2023-10-05

## 2023-11-17 NOTE — PROGRESS NOTES
"PSYCHIATRIC FOLLOW-UP VISIT NOTE    Chief Complaint   Patient presents with    Depression     3 month F/U medication         History of Present Illness  38 y.o. year old White female with hx of MDD, COLUMBA, panic attacks, ADHD, insomnia, and tobacco use seen today for follow-up appointment and medication management.  Patient reports that she is been doing well since last visit.  Denies any recent depression.  No anhedonia, isolative behaviors, low motivation, feelings of guilt or shame, hopelessness or helplessness, or appetite changes.  Anxiety also well-controlled.  When all of her and her spouse as children in the home she sometimes feels overstimulated but otherwise feels anxiety is well-controlled.  This seems normative in nature.  Outside of that there was no excessive worry, restlessness, irritability, catastrophizing, or physical signs or symptoms of anxiety.  Good focus and concentration with current dosage of Adderall.  No issues with task completion or executive function.  She is sleeping well at night and feels rested in the morning. Patient denies SI/HI. Denies hallucinations and does not appear to be responding to internal stimuli or be internally preoccupied. No manic symptoms noted.       Objective:     Vitals:  Vitals:    11/17/23 0951   BP: 116/78   BP Location: Right arm   Patient Position: Sitting   BP Method: Large (Manual)   Pulse: 81   Temp: 98.2 °F (36.8 °C)   TempSrc: Temporal   SpO2: 99%   Weight: 58.9 kg (129 lb 13.6 oz)   Height: 5' 2.01" (1.575 m)       Wt Readings from Last 3 Encounters:   11/17/23 0951 58.9 kg (129 lb 13.6 oz)   09/19/23 0929 57.8 kg (127 lb 6.8 oz)   05/19/23 1005 63.5 kg (140 lb)         Medication:    Current Outpatient Medications:     DEXCOM G6 SENSOR Zara, 1 application by Implant route every 10 days., Disp: , Rfl:     DEXCOM G6 TRANSMITTER Zara, 1 each by dural route., Disp: , Rfl:     insulin lispro (HUMALOG KWIKPEN INSULIN) 100 unit/mL pen, Inject 6 Units into " the skin once daily., Disp: , Rfl:     LANTUS SOLOSTAR U-100 INSULIN glargine 100 units/mL SubQ pen, Inject 16 Units into the skin nightly., Disp: , Rfl:     metFORMIN (GLUCOPHAGE-XR) 500 MG ER 24hr tablet, Take 500 mg by mouth 2 (two) times daily., Disp: , Rfl:     MOUNJARO 2.5 mg/0.5 mL PnIj, Inject into the skin., Disp: , Rfl:     rosuvastatin (CRESTOR) 10 MG tablet, Take 10 mg by mouth every evening., Disp: , Rfl:     valACYclovir (VALTREX) 500 MG tablet, Take 1 tablet by mouth once daily, Disp: 30 tablet, Rfl: 6    dextroamphetamine-amphetamine (ADDERALL) 30 mg Tab, Take 1 tablet (30 mg total) by mouth 2 (two) times a day., Disp: 60 tablet, Rfl: 0    EScitalopram oxalate (LEXAPRO) 20 MG tablet, Take 1 tablet (20 mg total) by mouth once daily., Disp: 30 tablet, Rfl: 2       Significant Labs: - none at this time    Significant Imaging: - none at this time    Physical Exam  Vitals and nursing note reviewed.   Constitutional:       General: She is awake.      Appearance: Normal appearance.   Musculoskeletal:      Comments: Full ROM   Neurological:      Mental Status: She is alert.   Psychiatric:         Attention and Perception: Attention and perception normal. She does not perceive auditory or visual hallucinations.         Mood and Affect: Affect normal.         Speech: Speech normal.         Behavior: Behavior is cooperative.         Thought Content: Thought content does not include homicidal or suicidal ideation.         Cognition and Memory: Cognition and memory normal.          Review of Systems     Mental Status Exam:  Presentation:  - Appearance: 38 y.o. year old White female, appears stated age, appears Casually dressed and Well groomed  - Motility: Erect when standing, Steady gait, No EPS or Tremors, No psychomotor agitation or retardation appreciated  - Behavior: calm, cooperative, good eye contact  Speech:  - Character/Organization: spontaneous, fluent, normal volume, normal rate, normal  "rhythm  Emotional State:  - Mood: "happy"   - Affect: congruent and appropriate  Thought:  - Process: logical, linear, organized , goal-directed  - Preoccupations: no ruminations, rituals, or phobias appreciated  - Delusions: no persecutory, paranoid, or grandiose delusions appreciated  - Perception: denies AVH, not actively responding to internal stimuli  - SI/HI: denies/denies  Sensorium & Intellect:  - Sensorium: AAOx4  - Memory: intact to recent and remote events  - Attention/Concentration: good/good  - Insight/Judgement: good/good    Gait: normal swing and stance  MSK:no rigidity appreciated    All other systems without acute issues unless noted in HPI      Assessment/Plan      ICD-10-CM ICD-9-CM    1. Major depressive disorder, recurrent episode, moderate  F33.1 296.32 EScitalopram oxalate (LEXAPRO) 20 MG tablet      2. Generalized anxiety disorder  F41.1 300.02       3. Panic attack  F41.0 300.01       4. Attention deficit hyperactivity disorder (ADHD), unspecified ADHD type  F90.9 314.01 dextroamphetamine-amphetamine (ADDERALL) 30 mg Tab      5. Insomnia, unspecified type  G47.00 780.52       6. Tobacco user  Z72.0 305.1          Continue current medications without change    Potential side effects and risks vs benefits of current treatment plan reviewed with patient. Applicable black box warnings reviewed. Encouraged patient not to alter dosages or abruptly discontinue medications without contacting prescriber first, due to risk of worsening symptoms and decompensation of mental status. Warned of risks associated with herbal remedies and supplements while taking psychotropic medications and of the need to consult prescriber prior to adding any of these to current regimen. Patient should abstain from abuse of alcohol, prescription medications, and illicit drugs. Reviewed when to contact clinic and/or seek emergent care, such as but not limited to, onset/worsening SI/HI, hallucinations, delusions, manic " symptoms. Pt verbalized understanding and agreement of these warnings/recommendations and verbally consented to treatment plan.     checked. Results as expected. No suspected diversion or abuse of controlled substances.      Follow up in about 3 months (around 2/17/2024) for Medication Management.    Yefri Medrano, LUCHOP

## 2024-02-16 ENCOUNTER — OFFICE VISIT (OUTPATIENT)
Dept: BEHAVIORAL HEALTH | Facility: CLINIC | Age: 39
End: 2024-02-16
Payer: MEDICAID

## 2024-02-16 VITALS
SYSTOLIC BLOOD PRESSURE: 100 MMHG | WEIGHT: 125.25 LBS | HEART RATE: 85 BPM | OXYGEN SATURATION: 99 % | BODY MASS INDEX: 23.05 KG/M2 | HEIGHT: 62 IN | DIASTOLIC BLOOD PRESSURE: 64 MMHG | TEMPERATURE: 97 F

## 2024-02-16 DIAGNOSIS — F41.1 GENERALIZED ANXIETY DISORDER: ICD-10-CM

## 2024-02-16 DIAGNOSIS — F33.1 MAJOR DEPRESSIVE DISORDER, RECURRENT EPISODE, MODERATE: Primary | ICD-10-CM

## 2024-02-16 DIAGNOSIS — Z72.0 TOBACCO USER: ICD-10-CM

## 2024-02-16 DIAGNOSIS — F90.9 ATTENTION DEFICIT HYPERACTIVITY DISORDER (ADHD), UNSPECIFIED ADHD TYPE: ICD-10-CM

## 2024-02-16 DIAGNOSIS — F41.0 PANIC ATTACK: ICD-10-CM

## 2024-02-16 DIAGNOSIS — G47.00 INSOMNIA, UNSPECIFIED TYPE: ICD-10-CM

## 2024-02-16 PROCEDURE — 3078F DIAST BP <80 MM HG: CPT | Mod: CPTII,,, | Performed by: NURSE PRACTITIONER

## 2024-02-16 PROCEDURE — 1159F MED LIST DOCD IN RCRD: CPT | Mod: CPTII,,, | Performed by: NURSE PRACTITIONER

## 2024-02-16 PROCEDURE — 3008F BODY MASS INDEX DOCD: CPT | Mod: CPTII,,, | Performed by: NURSE PRACTITIONER

## 2024-02-16 PROCEDURE — 1160F RVW MEDS BY RX/DR IN RCRD: CPT | Mod: CPTII,,, | Performed by: NURSE PRACTITIONER

## 2024-02-16 PROCEDURE — 99214 OFFICE O/P EST MOD 30 MIN: CPT | Mod: SA,HB,, | Performed by: NURSE PRACTITIONER

## 2024-02-16 PROCEDURE — 3074F SYST BP LT 130 MM HG: CPT | Mod: CPTII,,, | Performed by: NURSE PRACTITIONER

## 2024-02-16 RX ORDER — HYDROXYZINE HYDROCHLORIDE 10 MG/1
10 TABLET, FILM COATED ORAL NIGHTLY
COMMUNITY
Start: 2024-01-18 | End: 2024-05-16 | Stop reason: SDUPTHER

## 2024-02-16 RX ORDER — DEXTROAMPHETAMINE SACCHARATE, AMPHETAMINE ASPARTATE, DEXTROAMPHETAMINE SULFATE AND AMPHETAMINE SULFATE 7.5; 7.5; 7.5; 7.5 MG/1; MG/1; MG/1; MG/1
30 TABLET ORAL 2 TIMES DAILY
Qty: 60 TABLET | Refills: 0 | Status: SHIPPED | OUTPATIENT
Start: 2024-02-16 | End: 2024-04-19 | Stop reason: SDUPTHER

## 2024-02-16 RX ORDER — ESCITALOPRAM OXALATE 20 MG/1
20 TABLET ORAL DAILY
Qty: 30 TABLET | Refills: 2 | Status: SHIPPED | OUTPATIENT
Start: 2024-02-16 | End: 2024-04-19 | Stop reason: SDUPTHER

## 2024-02-16 NOTE — PROGRESS NOTES
"PSYCHIATRIC FOLLOW-UP VISIT NOTE    Chief Complaint   Patient presents with    Depression     3 month Medication Management         History of Present Illness  38 y.o. year old White female with hx of MDD, COLUMBA, panic attack, ADHD, insomnia, and tobacco use seen today for follow-up appointment and medication management.  Patient reports that she has been doing well since last visit.  Denies any recent depression or anxiety.  Feels medications are effective for symptom control at present.  Mood has been happy most days of the week.  Had 1 or 2 days in the past 3 months where she felt significantly irritable, but felt much better after taking 1 of her p.r.n. hydroxyzine.  Did not carry her anger throughout the day he was able to move on in a calm manner.  Good focus and concentration with current dosage of Adderall.  Sleeping well at night and feels rested in the morning.  Denies any side effects from current medication regimen.Patient denies SI/HI. Denies hallucinations and does not appear to be responding to internal stimuli or be internally preoccupied. No manic symptoms noted.       Objective:     Vitals:  Vitals:    02/16/24 0807   BP: 100/64   BP Location: Right arm   Patient Position: Sitting   BP Method: Large (Manual)   Pulse: 85   Temp: 96.8 °F (36 °C)   TempSrc: Temporal   SpO2: 99%   Weight: 56.8 kg (125 lb 3.5 oz)   Height: 5' 2.01" (1.575 m)       Wt Readings from Last 3 Encounters:   02/16/24 0807 56.8 kg (125 lb 3.5 oz)   11/17/23 0951 58.9 kg (129 lb 13.6 oz)   09/19/23 0929 57.8 kg (127 lb 6.8 oz)         Medication:    Current Outpatient Medications:     DEXCOM G6 SENSOR Zara, 1 application by Implant route every 10 days., Disp: , Rfl:     DEXCOM G6 TRANSMITTER Zara, 1 each by dural route., Disp: , Rfl:     hydrOXYzine HCL (ATARAX) 10 MG Tab, Take 10 mg by mouth every evening., Disp: , Rfl:     insulin lispro (HUMALOG KWIKPEN INSULIN) 100 unit/mL pen, Inject 6 Units into the skin once daily., Disp: , " "Rfl:     LANTUS SOLOSTAR U-100 INSULIN glargine 100 units/mL SubQ pen, Inject 16 Units into the skin nightly., Disp: , Rfl:     metFORMIN (GLUCOPHAGE-XR) 500 MG ER 24hr tablet, Take 500 mg by mouth 2 (two) times daily., Disp: , Rfl:     MOUNJARO 2.5 mg/0.5 mL PnIj, Inject into the skin., Disp: , Rfl:     rosuvastatin (CRESTOR) 10 MG tablet, Take 10 mg by mouth every evening., Disp: , Rfl:     valACYclovir (VALTREX) 500 MG tablet, Take 1 tablet by mouth once daily, Disp: 30 tablet, Rfl: 6    dextroamphetamine-amphetamine (ADDERALL) 30 mg Tab, Take 1 tablet (30 mg total) by mouth 2 (two) times a day., Disp: 60 tablet, Rfl: 0    EScitalopram oxalate (LEXAPRO) 20 MG tablet, Take 1 tablet (20 mg total) by mouth once daily., Disp: 30 tablet, Rfl: 2       Significant Labs: - none at this time    Significant Imaging: - none at this time    Physical Exam  Vitals and nursing note reviewed.   Constitutional:       General: She is awake.      Appearance: Normal appearance.   Musculoskeletal:      Comments: Full ROM   Neurological:      Mental Status: She is alert.   Psychiatric:         Attention and Perception: Attention and perception normal. She does not perceive auditory or visual hallucinations.         Mood and Affect: Affect normal.         Speech: Speech normal.         Behavior: Behavior is cooperative.         Thought Content: Thought content does not include homicidal or suicidal ideation.         Cognition and Memory: Cognition and memory normal.          Review of Systems     Mental Status Exam:  Presentation:  - Appearance: 38 y.o. year old White female, appears stated age, appears Casually dressed and Well groomed  - Motility: Erect when standing, Steady gait, No EPS or Tremors, No psychomotor agitation or retardation appreciated  - Behavior: calm, cooperative, good eye contact  Speech:  - Character/Organization: spontaneous, fluent, normal volume, normal rate, normal rhythm  Emotional State:  - Mood: "happy"   - " Affect: congruent and appropriate  Thought:  - Process: logical, linear, organized , goal-directed  - Preoccupations: no ruminations, rituals, or phobias appreciated  - Delusions: no persecutory, paranoid, or grandiose delusions appreciated  - Perception: denies AVH, not actively responding to internal stimuli  - SI/HI: denies/denies  Sensorium & Intellect:  - Sensorium: AAOx4  - Memory: intact to recent and remote events  - Attention/Concentration: good/good  - Insight/Judgement: good/good    Gait: normal swing and stance  MSK:no rigidity appreciated    All other systems without acute issues unless noted in HPI      Assessment/Plan      ICD-10-CM ICD-9-CM    1. Major depressive disorder, recurrent episode, moderate  F33.1 296.32 EScitalopram oxalate (LEXAPRO) 20 MG tablet      2. Generalized anxiety disorder  F41.1 300.02       3. Panic attack  F41.0 300.01       4. Attention deficit hyperactivity disorder (ADHD), unspecified ADHD type  F90.9 314.01 dextroamphetamine-amphetamine (ADDERALL) 30 mg Tab      5. Insomnia, unspecified type  G47.00 780.52       6. Tobacco user  Z72.0 305.1          Continue current medications without change    Potential side effects and risks vs benefits of current treatment plan reviewed with patient. Applicable black box warnings reviewed. Encouraged patient not to alter dosages or abruptly discontinue medications without contacting prescriber first, due to risk of worsening symptoms and decompensation of mental status. Warned of risks associated with herbal remedies and supplements while taking psychotropic medications and of the need to consult prescriber prior to adding any of these to current regimen. Patient should abstain from abuse of alcohol, prescription medications, and illicit drugs. Reviewed when to contact clinic and/or seek emergent care, such as but not limited to, onset/worsening SI/HI, hallucinations, delusions, manic symptoms. Pt verbalized understanding and agreement  of these warnings/recommendations and verbally consented to treatment plan.     checked. Results as expected. No suspected diversion or abuse of controlled substances.      Follow up in about 3 months (around 5/16/2024) for Medication Management.    Yefri Medrano, LUCHOP

## 2024-04-19 ENCOUNTER — TELEPHONE (OUTPATIENT)
Dept: FAMILY MEDICINE | Facility: CLINIC | Age: 39
End: 2024-04-19
Payer: MEDICAID

## 2024-04-19 ENCOUNTER — TELEPHONE (OUTPATIENT)
Dept: BEHAVIORAL HEALTH | Facility: CLINIC | Age: 39
End: 2024-04-19
Payer: MEDICAID

## 2024-04-19 DIAGNOSIS — F90.9 ATTENTION DEFICIT HYPERACTIVITY DISORDER (ADHD), UNSPECIFIED ADHD TYPE: ICD-10-CM

## 2024-04-19 DIAGNOSIS — F33.1 MAJOR DEPRESSIVE DISORDER, RECURRENT EPISODE, MODERATE: ICD-10-CM

## 2024-04-19 RX ORDER — DEXTROAMPHETAMINE SACCHARATE, AMPHETAMINE ASPARTATE, DEXTROAMPHETAMINE SULFATE AND AMPHETAMINE SULFATE 7.5; 7.5; 7.5; 7.5 MG/1; MG/1; MG/1; MG/1
30 TABLET ORAL 2 TIMES DAILY
Qty: 60 TABLET | Refills: 0 | Status: SHIPPED | OUTPATIENT
Start: 2024-04-19 | End: 2024-05-16 | Stop reason: SDUPTHER

## 2024-04-19 RX ORDER — ESCITALOPRAM OXALATE 20 MG/1
20 TABLET ORAL DAILY
Qty: 30 TABLET | Refills: 0 | Status: SHIPPED | OUTPATIENT
Start: 2024-04-19 | End: 2024-05-16 | Stop reason: SDUPTHER

## 2024-04-23 DIAGNOSIS — B00.9 HSV-1 (HERPES SIMPLEX VIRUS 1) INFECTION: ICD-10-CM

## 2024-04-23 RX ORDER — VALACYCLOVIR HYDROCHLORIDE 500 MG/1
500 TABLET, FILM COATED ORAL
Qty: 30 TABLET | Refills: 1 | Status: SHIPPED | OUTPATIENT
Start: 2024-04-23

## 2024-05-16 ENCOUNTER — OFFICE VISIT (OUTPATIENT)
Dept: BEHAVIORAL HEALTH | Facility: CLINIC | Age: 39
End: 2024-05-16
Payer: MEDICAID

## 2024-05-16 VITALS
TEMPERATURE: 98 F | SYSTOLIC BLOOD PRESSURE: 102 MMHG | BODY MASS INDEX: 22.68 KG/M2 | WEIGHT: 123.25 LBS | HEIGHT: 62 IN | HEART RATE: 81 BPM | DIASTOLIC BLOOD PRESSURE: 72 MMHG | OXYGEN SATURATION: 98 %

## 2024-05-16 DIAGNOSIS — F90.9 ATTENTION DEFICIT HYPERACTIVITY DISORDER (ADHD), UNSPECIFIED ADHD TYPE: ICD-10-CM

## 2024-05-16 DIAGNOSIS — F41.1 GENERALIZED ANXIETY DISORDER: ICD-10-CM

## 2024-05-16 DIAGNOSIS — F41.0 PANIC ATTACK: ICD-10-CM

## 2024-05-16 DIAGNOSIS — G47.00 INSOMNIA, UNSPECIFIED TYPE: ICD-10-CM

## 2024-05-16 DIAGNOSIS — F33.1 MAJOR DEPRESSIVE DISORDER, RECURRENT EPISODE, MODERATE: Primary | ICD-10-CM

## 2024-05-16 PROCEDURE — 1159F MED LIST DOCD IN RCRD: CPT | Mod: CPTII,,, | Performed by: NURSE PRACTITIONER

## 2024-05-16 PROCEDURE — 3078F DIAST BP <80 MM HG: CPT | Mod: CPTII,,, | Performed by: NURSE PRACTITIONER

## 2024-05-16 PROCEDURE — 1160F RVW MEDS BY RX/DR IN RCRD: CPT | Mod: CPTII,,, | Performed by: NURSE PRACTITIONER

## 2024-05-16 PROCEDURE — 99214 OFFICE O/P EST MOD 30 MIN: CPT | Mod: SA,HB,, | Performed by: NURSE PRACTITIONER

## 2024-05-16 PROCEDURE — 3074F SYST BP LT 130 MM HG: CPT | Mod: CPTII,,, | Performed by: NURSE PRACTITIONER

## 2024-05-16 PROCEDURE — 3008F BODY MASS INDEX DOCD: CPT | Mod: CPTII,,, | Performed by: NURSE PRACTITIONER

## 2024-05-16 RX ORDER — ESCITALOPRAM OXALATE 20 MG/1
20 TABLET ORAL DAILY
Qty: 30 TABLET | Refills: 2 | Status: SHIPPED | OUTPATIENT
Start: 2024-05-16

## 2024-05-16 RX ORDER — DEXTROAMPHETAMINE SACCHARATE, AMPHETAMINE ASPARTATE, DEXTROAMPHETAMINE SULFATE AND AMPHETAMINE SULFATE 7.5; 7.5; 7.5; 7.5 MG/1; MG/1; MG/1; MG/1
30 TABLET ORAL 2 TIMES DAILY
Qty: 60 TABLET | Refills: 0 | Status: SHIPPED | OUTPATIENT
Start: 2024-05-16 | End: 2024-06-15

## 2024-05-16 RX ORDER — HYDROXYZINE HYDROCHLORIDE 10 MG/1
10 TABLET, FILM COATED ORAL NIGHTLY
Qty: 30 TABLET | Refills: 2 | Status: SHIPPED | OUTPATIENT
Start: 2024-05-16

## 2024-05-16 NOTE — PROGRESS NOTES
"PSYCHIATRIC FOLLOW-UP VISIT NOTE    Chief Complaint   Patient presents with    Medication Management     3 month medication management         History of Present Illness  39 y.o. year old White female with hx of MDD, COLUMBA, panic attacks, and ADHD seen today for follow-up appointment and medication management.  Patient reports that she has been doing well since last visit.  Denies any recent depression.  Describes mood is happy most days of the week.  Has been busy at home and with children's extracurricular activities.  States she and her spouse are getting along very well.  He is now off of all of his medications after losing his insurance but his mood and day-to-day function have improved without them.  Anxiety is currently well-controlled.  Does help her aunt with Parkinson's as well as her aging parents.  Good focus and concentration.  No issues with task completion or executive function.  She is sleeping well at night and feels rested in the morning.  Denies any side effects from current medication regimen. Patient denies SI/HI. Denies hallucinations and does not appear to be responding to internal stimuli or be internally preoccupied. No manic symptoms noted.       Objective:     Vitals:  Vitals:    05/16/24 0931   BP: 102/72   BP Location: Right arm   Patient Position: Sitting   BP Method: Large (Manual)   Pulse: 81   Temp: 98.1 °F (36.7 °C)   TempSrc: Temporal   SpO2: 98%   Weight: 55.9 kg (123 lb 3.8 oz)   Height: 5' 2.01" (1.575 m)       Wt Readings from Last 3 Encounters:   05/16/24 0931 55.9 kg (123 lb 3.8 oz)   02/16/24 0807 56.8 kg (125 lb 3.5 oz)   11/17/23 0951 58.9 kg (129 lb 13.6 oz)         Medication:    Current Outpatient Medications:     insulin lispro (HUMALOG KWIKPEN INSULIN) 100 unit/mL pen, Inject 6 Units into the skin once daily., Disp: , Rfl:     LANTUS SOLOSTAR U-100 INSULIN glargine 100 units/mL SubQ pen, Inject 16 Units into the skin nightly., Disp: , Rfl:     metFORMIN (GLUCOPHAGE-XR) " "500 MG ER 24hr tablet, Take 500 mg by mouth 2 (two) times daily., Disp: , Rfl:     MOUNJARO 2.5 mg/0.5 mL PnIj, Inject into the skin., Disp: , Rfl:     rosuvastatin (CRESTOR) 10 MG tablet, Take 10 mg by mouth every evening., Disp: , Rfl:     valACYclovir (VALTREX) 500 MG tablet, TAKE 1 TABLET BY MOUTH ONCE DAILY, Disp: 30 tablet, Rfl: 1    dextroamphetamine-amphetamine (ADDERALL) 30 mg Tab, Take 1 tablet (30 mg total) by mouth 2 (two) times a day., Disp: 60 tablet, Rfl: 0    EScitalopram oxalate (LEXAPRO) 20 MG tablet, Take 1 tablet (20 mg total) by mouth once daily., Disp: 30 tablet, Rfl: 2    hydrOXYzine HCL (ATARAX) 10 MG Tab, Take 1 tablet (10 mg total) by mouth every evening., Disp: 30 tablet, Rfl: 2       Significant Labs: - none at this time    Significant Imaging: - none at this time    Physical Exam  Vitals and nursing note reviewed.   Constitutional:       General: She is awake.      Appearance: Normal appearance.   Musculoskeletal:      Comments: Full ROM   Neurological:      Mental Status: She is alert.   Psychiatric:         Attention and Perception: Attention and perception normal. She does not perceive auditory or visual hallucinations.         Mood and Affect: Affect normal.         Speech: Speech normal.         Behavior: Behavior is cooperative.         Thought Content: Thought content does not include homicidal or suicidal ideation.         Cognition and Memory: Cognition and memory normal.          Review of Systems     Mental Status Exam:  Presentation:  - Appearance: 39 y.o. year old White female, appears stated age, appears Casually dressed and Well groomed  - Motility: Erect when standing, Steady gait, No EPS or Tremors, No psychomotor agitation or retardation appreciated  - Behavior: calm, cooperative, good eye contact  Speech:  - Character/Organization: spontaneous, fluent, normal volume, normal rate, normal rhythm  Emotional State:  - Mood: "happy"   - Affect: congruent and " appropriate  Thought:  - Process: logical, linear, organized , goal-directed  - Preoccupations: no ruminations, rituals, or phobias appreciated  - Delusions: no persecutory, paranoid, or grandiose delusions appreciated  - Perception: denies AVH, not actively responding to internal stimuli  - SI/HI: denies/denies  Sensorium & Intellect:  - Sensorium: AAOx4  - Memory: intact to recent and remote events  - Attention/Concentration: good/good  - Insight/Judgement: good/good    Gait: normal swing and stance  MSK:no rigidity appreciated    All other systems without acute issues unless noted in HPI      Assessment/Plan      ICD-10-CM ICD-9-CM    1. Major depressive disorder, recurrent episode, moderate  F33.1 296.32 EScitalopram oxalate (LEXAPRO) 20 MG tablet      2. Generalized anxiety disorder  F41.1 300.02 hydrOXYzine HCL (ATARAX) 10 MG Tab      3. Panic attack  F41.0 300.01       4. Attention deficit hyperactivity disorder (ADHD), unspecified ADHD type  F90.9 314.01 dextroamphetamine-amphetamine (ADDERALL) 30 mg Tab      5. Insomnia, unspecified type  G47.00 780.52          Continue current medications without change    Potential side effects and risks vs benefits of current treatment plan reviewed with patient. Applicable black box warnings reviewed. Encouraged patient not to alter dosages or abruptly discontinue medications without contacting prescriber first, due to risk of worsening symptoms and decompensation of mental status. Warned of risks associated with herbal remedies and supplements while taking psychotropic medications and of the need to consult prescriber prior to adding any of these to current regimen. Patient should abstain from abuse of alcohol, prescription medications, and illicit drugs. Reviewed when to contact clinic and/or seek emergent care, such as but not limited to, onset/worsening SI/HI, hallucinations, delusions, manic symptoms. Pt verbalized understanding and agreement of these  warnings/recommendations and verbally consented to treatment plan.      Follow up in about 3 months (around 8/16/2024) for Medication Management.    Yefri Medrano, LUCHOP

## 2024-06-06 LAB
LEFT EYE DM RETINOPATHY: NEGATIVE
RIGHT EYE DM RETINOPATHY: NEGATIVE

## 2024-06-22 DIAGNOSIS — B00.9 HSV-1 (HERPES SIMPLEX VIRUS 1) INFECTION: ICD-10-CM

## 2024-06-24 RX ORDER — VALACYCLOVIR HYDROCHLORIDE 500 MG/1
500 TABLET, FILM COATED ORAL
Qty: 30 TABLET | Refills: 4 | Status: SHIPPED | OUTPATIENT
Start: 2024-06-24

## 2024-07-31 ENCOUNTER — TELEPHONE (OUTPATIENT)
Dept: FAMILY MEDICINE | Facility: CLINIC | Age: 39
End: 2024-07-31
Payer: MEDICAID

## 2024-08-01 NOTE — TELEPHONE ENCOUNTER
Called and spoke with patient and explained the dosage of Lexapro. Patient verbalized understanding of dosage and will talk about options at next visit.

## 2024-08-01 NOTE — TELEPHONE ENCOUNTER
20mg/day is the maximum recommended daily dose of Lexapro. Should continue taking 20mg/day and we can discuss options at her next visit

## 2024-08-19 ENCOUNTER — OFFICE VISIT (OUTPATIENT)
Dept: BEHAVIORAL HEALTH | Facility: CLINIC | Age: 39
End: 2024-08-19
Payer: MEDICAID

## 2024-08-19 VITALS
SYSTOLIC BLOOD PRESSURE: 108 MMHG | TEMPERATURE: 99 F | HEIGHT: 62 IN | DIASTOLIC BLOOD PRESSURE: 72 MMHG | HEART RATE: 77 BPM | WEIGHT: 121.06 LBS | BODY MASS INDEX: 22.28 KG/M2 | OXYGEN SATURATION: 98 %

## 2024-08-19 DIAGNOSIS — F90.9 ATTENTION DEFICIT HYPERACTIVITY DISORDER (ADHD), UNSPECIFIED ADHD TYPE: ICD-10-CM

## 2024-08-19 DIAGNOSIS — F33.1 MAJOR DEPRESSIVE DISORDER, RECURRENT EPISODE, MODERATE: Primary | ICD-10-CM

## 2024-08-19 DIAGNOSIS — Z72.0 TOBACCO USER: ICD-10-CM

## 2024-08-19 DIAGNOSIS — F41.0 PANIC ATTACK: ICD-10-CM

## 2024-08-19 DIAGNOSIS — G47.00 INSOMNIA, UNSPECIFIED TYPE: ICD-10-CM

## 2024-08-19 DIAGNOSIS — F41.1 GENERALIZED ANXIETY DISORDER: ICD-10-CM

## 2024-08-19 PROCEDURE — 3008F BODY MASS INDEX DOCD: CPT | Mod: CPTII,,, | Performed by: NURSE PRACTITIONER

## 2024-08-19 PROCEDURE — 1160F RVW MEDS BY RX/DR IN RCRD: CPT | Mod: CPTII,,, | Performed by: NURSE PRACTITIONER

## 2024-08-19 PROCEDURE — 99214 OFFICE O/P EST MOD 30 MIN: CPT | Mod: SA,HB,, | Performed by: NURSE PRACTITIONER

## 2024-08-19 PROCEDURE — 3078F DIAST BP <80 MM HG: CPT | Mod: CPTII,,, | Performed by: NURSE PRACTITIONER

## 2024-08-19 PROCEDURE — 3074F SYST BP LT 130 MM HG: CPT | Mod: CPTII,,, | Performed by: NURSE PRACTITIONER

## 2024-08-19 PROCEDURE — 1159F MED LIST DOCD IN RCRD: CPT | Mod: CPTII,,, | Performed by: NURSE PRACTITIONER

## 2024-08-19 RX ORDER — ESCITALOPRAM OXALATE 20 MG/1
20 TABLET ORAL DAILY
Qty: 30 TABLET | Refills: 0 | Status: SHIPPED | OUTPATIENT
Start: 2024-08-19

## 2024-08-19 RX ORDER — HYDROXYZINE HYDROCHLORIDE 10 MG/1
10 TABLET, FILM COATED ORAL NIGHTLY
Qty: 30 TABLET | Refills: 0 | Status: SHIPPED | OUTPATIENT
Start: 2024-08-19

## 2024-08-19 RX ORDER — DEXTROAMPHETAMINE SACCHARATE, AMPHETAMINE ASPARTATE, DEXTROAMPHETAMINE SULFATE AND AMPHETAMINE SULFATE 7.5; 7.5; 7.5; 7.5 MG/1; MG/1; MG/1; MG/1
30 TABLET ORAL 2 TIMES DAILY
Qty: 60 TABLET | Refills: 0 | Status: SHIPPED | OUTPATIENT
Start: 2024-08-19 | End: 2024-09-18

## 2024-08-19 RX ORDER — ESCITALOPRAM OXALATE 5 MG/1
5 TABLET ORAL DAILY
Qty: 30 TABLET | Refills: 0 | Status: SHIPPED | OUTPATIENT
Start: 2024-08-19 | End: 2024-09-18

## 2024-08-19 NOTE — PROGRESS NOTES
"PSYCHIATRIC FOLLOW-UP VISIT NOTE    Chief Complaint   Patient presents with    Medication Management     3 month medication management         History of Present Illness  39 y.o. year old White female with hx of MDD, COLUMBA, panic attacks, ADHD, insomnia, and tobacco use seen today for follow-up appointment and medication management.  Patient reports that she has had increased anxiety recently.  Previously called about increasing her Lexapro dose due to increased anxiety.  Depression is well-controlled at present.  Her oldest son has a listed in boot camp in the [employer], has only been there for a few days but she was very nervous about how he will do.  Her other son has been having high blood pressure and has an appointment with his cardiologist on August 22nd.  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  He also received a DUI while they were out boating on June 28th.  He was still awaiting initial court date.  She has had a difficult time coping with this anxiety and wishes to increase her Lexapro to 25 mg daily.  Previously she would take it 30 mg daily and tolerated well. Patient denies SI/HI. Denies hallucinations and does not appear to be responding to internal stimuli or be internally preoccupied. No manic symptoms noted.  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      Objective:     Vitals:  Vitals:    08/19/24 0847   BP: 108/72   BP Location: Right arm   Patient Position: Sitting   BP Method: Medium (Manual)   Pulse: 77   Temp: 98.8 °F (37.1 °C)   TempSrc: Temporal   SpO2: 98%   Weight: 54.9 kg (121 lb 0.5 oz)   Height: 5' 2.01" (1.575 m)       Wt Readings from Last 3 Encounters:   08/19/24 0847 54.9 kg (121 lb 0.5 oz)   05/16/24 0931 55.9 kg (123 lb 3.8 oz)   02/16/24 0807 56.8 kg (125 lb 3.5 oz)         Medication:    Current Outpatient Medications:     LANTUS SOLOSTAR U-100 INSULIN " glargine 100 units/mL SubQ pen, Inject 6 Units into the skin nightly., Disp: , Rfl:     metFORMIN (GLUCOPHAGE-XR) 500 MG ER 24hr tablet, Take 500 mg by mouth 2 (two) times daily., Disp: , Rfl:     MOUNJARO 2.5 mg/0.5 mL PnIj, Inject into the skin., Disp: , Rfl:     rosuvastatin (CRESTOR) 10 MG tablet, Take 10 mg by mouth every evening., Disp: , Rfl:     valACYclovir (VALTREX) 500 MG tablet, TAKE 1 TABLET BY MOUTH ONCE DAILY, Disp: 30 tablet, Rfl: 4    dextroamphetamine-amphetamine (ADDERALL) 30 mg Tab, Take 1 tablet (30 mg total) by mouth 2 (two) times a day., Disp: 60 tablet, Rfl: 0    EScitalopram oxalate (LEXAPRO) 20 MG tablet, Take 1 tablet (20 mg total) by mouth once daily. Take with 5mg tablet for total dose of 25mg/day, Disp: 30 tablet, Rfl: 0    EScitalopram oxalate (LEXAPRO) 5 MG Tab, Take 1 tablet (5 mg total) by mouth once daily. Take with 20mg tab for total dose of 25mg/day, Disp: 30 tablet, Rfl: 0    hydrOXYzine HCL (ATARAX) 10 MG Tab, Take 1 tablet (10 mg total) by mouth every evening., Disp: 30 tablet, Rfl: 0       Significant Labs: - none at this time    Significant Imaging: - none at this time    Physical Exam  Vitals and nursing note reviewed.   Constitutional:       General: She is awake.      Appearance: Normal appearance.   Musculoskeletal:      Comments: Full ROM   Neurological:      Mental Status: She is alert.   Psychiatric:         Attention and Perception: Attention and perception normal. She does not perceive auditory or visual hallucinations.         Mood and Affect: Affect normal. Mood is anxious.         Speech: Speech normal.         Behavior: Behavior is cooperative.         Thought Content: Thought content does not include homicidal or suicidal ideation.         Cognition and Memory: Cognition and memory normal.          Review of Systems     Mental Status Exam:  Presentation:  - Appearance: 39 y.o. year old White female, appears stated age, appears Casually dressed and Well  "groomed  - Motility: Erect when standing, Steady gait, No EPS or Tremors, No psychomotor agitation or retardation appreciated  - Behavior: anxious, cooperative, maintains eye contact  Speech:  - Character/Organization: spontaneous, fluent, normal volume, normal rate, normal rhythm  Emotional State:  - Mood: "anxious"   - Affect: congruent and anxious  Thought:  - Process: logical, linear, organized , goal-directed  - Preoccupations: family  - Delusions: no persecutory, paranoid, or grandiose delusions appreciated  - Perception: denies AVH, not actively responding to internal stimuli  - SI/HI: denies/denies  Sensorium & Intellect:  - Sensorium: AAOx4  - Memory: intact to recent and remote events  - Attention/Concentration: good/good  - Insight/Judgement: good/good    Gait: normal swing and stance  MSK:no rigidity appreciated    All other systems without acute issues unless noted in HPI      Assessment/Plan      ICD-10-CM ICD-9-CM    1. Major depressive disorder, recurrent episode, moderate  F33.1 296.32 EScitalopram oxalate (LEXAPRO) 20 MG tablet      EScitalopram oxalate (LEXAPRO) 5 MG Tab      2. Generalized anxiety disorder  F41.1 300.02 hydrOXYzine HCL (ATARAX) 10 MG Tab      3. Panic attack  F41.0 300.01       4. Attention deficit hyperactivity disorder (ADHD), unspecified ADHD type  F90.9 314.01 dextroamphetamine-amphetamine (ADDERALL) 30 mg Tab      5. Insomnia, unspecified type  G47.00 780.52       6. Tobacco user  Z72.0 305.1          Increase Lexapro to 25 mg daily.  Patient previously tolerated 30 mg daily without adverse effects    Continue other medications without change    Potential side effects and risks vs benefits of current treatment plan reviewed with patient. Applicable black box warnings reviewed. Encouraged patient not to alter dosages or abruptly discontinue medications without contacting prescriber first, due to risk of worsening symptoms and decompensation of mental status. Warned of risks " associated with herbal remedies and supplements while taking psychotropic medications and of the need to consult prescriber prior to adding any of these to current regimen. Patient should abstain from abuse of alcohol, prescription medications, and illicit drugs. Reviewed when to contact clinic and/or seek emergent care, such as but not limited to, onset/worsening SI/HI, hallucinations, delusions, manic symptoms. Pt verbalized understanding and agreement of these warnings/recommendations and verbally consented to treatment plan.     checked. Results as expected. No suspected diversion or abuse of controlled substances.      Follow up in about 4 weeks (around 9/16/2024) for Medication Management.    Yefri Medrano, LUCHOP

## 2024-09-19 ENCOUNTER — TELEPHONE (OUTPATIENT)
Dept: BEHAVIORAL HEALTH | Facility: CLINIC | Age: 39
End: 2024-09-19
Payer: MEDICAID

## 2024-09-19 ENCOUNTER — TELEPHONE (OUTPATIENT)
Dept: FAMILY MEDICINE | Facility: CLINIC | Age: 39
End: 2024-09-19
Payer: MEDICAID

## 2024-09-19 DIAGNOSIS — F33.1 MAJOR DEPRESSIVE DISORDER, RECURRENT EPISODE, MODERATE: ICD-10-CM

## 2024-09-19 RX ORDER — ESCITALOPRAM OXALATE 5 MG/1
5 TABLET ORAL DAILY
Qty: 30 TABLET | Refills: 0 | Status: SHIPPED | OUTPATIENT
Start: 2024-09-19 | End: 2024-10-19

## 2024-09-19 RX ORDER — ESCITALOPRAM OXALATE 20 MG/1
20 TABLET ORAL DAILY
Qty: 30 TABLET | Refills: 0 | Status: SHIPPED | OUTPATIENT
Start: 2024-09-19

## 2024-09-30 ENCOUNTER — OFFICE VISIT (OUTPATIENT)
Dept: BEHAVIORAL HEALTH | Facility: CLINIC | Age: 39
End: 2024-09-30
Payer: MEDICAID

## 2024-09-30 VITALS
TEMPERATURE: 99 F | OXYGEN SATURATION: 100 % | HEART RATE: 72 BPM | SYSTOLIC BLOOD PRESSURE: 128 MMHG | BODY MASS INDEX: 21.83 KG/M2 | DIASTOLIC BLOOD PRESSURE: 82 MMHG | HEIGHT: 62 IN | WEIGHT: 118.63 LBS

## 2024-09-30 DIAGNOSIS — G47.00 INSOMNIA, UNSPECIFIED TYPE: ICD-10-CM

## 2024-09-30 DIAGNOSIS — F90.9 ATTENTION DEFICIT HYPERACTIVITY DISORDER (ADHD), UNSPECIFIED ADHD TYPE: ICD-10-CM

## 2024-09-30 DIAGNOSIS — Z72.0 TOBACCO USER: ICD-10-CM

## 2024-09-30 DIAGNOSIS — F33.1 MAJOR DEPRESSIVE DISORDER, RECURRENT EPISODE, MODERATE: Primary | ICD-10-CM

## 2024-09-30 DIAGNOSIS — F41.0 PANIC ATTACK: ICD-10-CM

## 2024-09-30 DIAGNOSIS — F41.1 GENERALIZED ANXIETY DISORDER: ICD-10-CM

## 2024-09-30 PROCEDURE — 99214 OFFICE O/P EST MOD 30 MIN: CPT | Mod: SA,HB,, | Performed by: NURSE PRACTITIONER

## 2024-09-30 PROCEDURE — 3079F DIAST BP 80-89 MM HG: CPT | Mod: CPTII,,, | Performed by: NURSE PRACTITIONER

## 2024-09-30 PROCEDURE — 1160F RVW MEDS BY RX/DR IN RCRD: CPT | Mod: CPTII,,, | Performed by: NURSE PRACTITIONER

## 2024-09-30 PROCEDURE — 1159F MED LIST DOCD IN RCRD: CPT | Mod: CPTII,,, | Performed by: NURSE PRACTITIONER

## 2024-09-30 PROCEDURE — 3008F BODY MASS INDEX DOCD: CPT | Mod: CPTII,,, | Performed by: NURSE PRACTITIONER

## 2024-09-30 PROCEDURE — 3074F SYST BP LT 130 MM HG: CPT | Mod: CPTII,,, | Performed by: NURSE PRACTITIONER

## 2024-09-30 RX ORDER — DEXTROAMPHETAMINE SACCHARATE, AMPHETAMINE ASPARTATE, DEXTROAMPHETAMINE SULFATE AND AMPHETAMINE SULFATE 7.5; 7.5; 7.5; 7.5 MG/1; MG/1; MG/1; MG/1
30 TABLET ORAL 2 TIMES DAILY
Qty: 60 TABLET | Refills: 0 | Status: SHIPPED | OUTPATIENT
Start: 2024-09-30 | End: 2024-10-30

## 2024-09-30 RX ORDER — HYDROXYZINE HYDROCHLORIDE 10 MG/1
10 TABLET, FILM COATED ORAL NIGHTLY
Qty: 30 TABLET | Refills: 2 | Status: SHIPPED | OUTPATIENT
Start: 2024-09-30

## 2024-09-30 RX ORDER — ESCITALOPRAM OXALATE 20 MG/1
20 TABLET ORAL DAILY
Qty: 30 TABLET | Refills: 2 | Status: SHIPPED | OUTPATIENT
Start: 2024-09-30

## 2024-09-30 RX ORDER — ESCITALOPRAM OXALATE 5 MG/1
5 TABLET ORAL DAILY
Qty: 30 TABLET | Refills: 2 | Status: SHIPPED | OUTPATIENT
Start: 2024-09-30

## 2024-09-30 RX ORDER — BLOOD-GLUCOSE SENSOR
1 EACH MISCELLANEOUS
COMMUNITY
Start: 2024-09-06

## 2024-09-30 NOTE — PROGRESS NOTES
"PSYCHIATRIC FOLLOW-UP VISIT NOTE    Chief Complaint   Patient presents with    Medication Management     4 week medication management         History of Present Illness  39 y.o. year old White female with hx of MDD, COLUMBA, panic attacks, ADHD, insomnia, and tobacco use seen today for follow-up appointment and medication management.  Patient reports that she has been doing very well since last visit.  Reports improvement with increased dosage of Lexapro.  Denies any recent depression.  Anxiety has been minimal and she has been less reactive and more relaxed whenever stressors do occur.  Able to cope with the more effectively and move on with her day.  Good focus and concentration with current dosage of Adderall.  Reports things has been good at home and she and her spouse has been getting along well.  Still enjoying her job and doing well in that setting too.  Sleeping well at night and feels rested in the morning.  Denies any side effects from current medication regimen. Patient denies SI/HI. Denies hallucinations and does not appear to be responding to internal stimuli or be internally preoccupied. No manic symptoms noted.      Objective:     Vitals:  Vitals:    09/30/24 1122   BP: 128/82   BP Location: Right arm   Patient Position: Sitting   Pulse: 72   Temp: 98.8 °F (37.1 °C)   TempSrc: Temporal   SpO2: 100%   Weight: 53.8 kg (118 lb 9.7 oz)   Height: 5' 2.01" (1.575 m)       Wt Readings from Last 3 Encounters:   09/30/24 1122 53.8 kg (118 lb 9.7 oz)   08/19/24 0847 54.9 kg (121 lb 0.5 oz)   05/16/24 0931 55.9 kg (123 lb 3.8 oz)         Medication:    Current Outpatient Medications:     DEXCOM G7 SENSOR Zara, 1 each by subcutaneous (via wearable injector) route every 14 (fourteen) days., Disp: , Rfl:     LANTUS SOLOSTAR U-100 INSULIN glargine 100 units/mL SubQ pen, Inject 6 Units into the skin nightly., Disp: , Rfl:     metFORMIN (GLUCOPHAGE-XR) 500 MG ER 24hr tablet, Take 500 mg by mouth 2 (two) times daily., " Disp: , Rfl:     MOUNJARO 2.5 mg/0.5 mL PnIj, Inject into the skin., Disp: , Rfl:     rosuvastatin (CRESTOR) 10 MG tablet, Take 10 mg by mouth every evening., Disp: , Rfl:     valACYclovir (VALTREX) 500 MG tablet, TAKE 1 TABLET BY MOUTH ONCE DAILY, Disp: 30 tablet, Rfl: 4    dextroamphetamine-amphetamine (ADDERALL) 30 mg Tab, Take 1 tablet (30 mg total) by mouth 2 (two) times a day., Disp: 60 tablet, Rfl: 0    EScitalopram oxalate (LEXAPRO) 20 MG tablet, Take 1 tablet (20 mg total) by mouth once daily. Take with 5mg tablet for total dose of 25mg/day, Disp: 30 tablet, Rfl: 2    EScitalopram oxalate (LEXAPRO) 5 MG Tab, Take 1 tablet (5 mg total) by mouth once daily. Take with 20mg tab for total dose of 25mg/day, Disp: 30 tablet, Rfl: 2    hydrOXYzine HCL (ATARAX) 10 MG Tab, Take 1 tablet (10 mg total) by mouth every evening., Disp: 30 tablet, Rfl: 2       Significant Labs: - none at this time    Significant Imaging: - none at this time    Physical Exam  Vitals and nursing note reviewed.   Constitutional:       General: She is awake.      Appearance: Normal appearance.   Musculoskeletal:      Comments: Full ROM   Neurological:      Mental Status: She is alert.   Psychiatric:         Attention and Perception: Attention and perception normal. She does not perceive auditory or visual hallucinations.         Mood and Affect: Affect normal.         Speech: Speech normal.         Behavior: Behavior is cooperative.         Thought Content: Thought content does not include homicidal or suicidal ideation.         Cognition and Memory: Cognition and memory normal.          Review of Systems     Mental Status Exam:  Presentation:  - Appearance: 39 y.o. year old White female, appears stated age, appears Casually dressed and Well groomed  - Motility: Erect when standing, Steady gait, No EPS or Tremors, No psychomotor agitation or retardation appreciated  - Behavior: calm, cooperative, good eye contact  Speech:  -  "Character/Organization: spontaneous, fluent, normal volume, normal rate, normal rhythm  Emotional State:  - Mood: "happy"   - Affect: congruent and appropriate  Thought:  - Process: logical, linear, organized , goal-directed  - Preoccupations: no ruminations, rituals, or phobias appreciated  - Delusions: no persecutory, paranoid, or grandiose delusions appreciated  - Perception: denies AVH, not actively responding to internal stimuli  - SI/HI: denies/denies  Sensorium & Intellect:  - Sensorium: AAOx4  - Memory: intact to recent and remote events  - Attention/Concentration: good/good  - Insight/Judgement: good/good    Gait: normal swing and stance  MSK:no rigidity appreciated    All other systems without acute issues unless noted in HPI      Assessment/Plan      ICD-10-CM ICD-9-CM    1. Major depressive disorder, recurrent episode, moderate  F33.1 296.32 EScitalopram oxalate (LEXAPRO) 20 MG tablet      EScitalopram oxalate (LEXAPRO) 5 MG Tab      2. Generalized anxiety disorder  F41.1 300.02 hydrOXYzine HCL (ATARAX) 10 MG Tab      3. Panic attack  F41.0 300.01       4. Attention deficit hyperactivity disorder (ADHD), unspecified ADHD type  F90.9 314.01 dextroamphetamine-amphetamine (ADDERALL) 30 mg Tab      5. Insomnia, unspecified type  G47.00 780.52       6. Tobacco user  Z72.0 305.1          Continue current medications without change    Potential side effects and risks vs benefits of current treatment plan reviewed with patient. Applicable black box warnings reviewed. Encouraged patient not to alter dosages or abruptly discontinue medications without contacting prescriber first, due to risk of worsening symptoms and decompensation of mental status. Warned of risks associated with herbal remedies and supplements while taking psychotropic medications and of the need to consult prescriber prior to adding any of these to current regimen. Patient should abstain from abuse of alcohol, prescription medications, and " illicit drugs. Reviewed when to contact clinic and/or seek emergent care, such as but not limited to, onset/worsening SI/HI, hallucinations, delusions, manic symptoms. Pt verbalized understanding and agreement of these warnings/recommendations and verbally consented to treatment plan.     checked. Results as expected. No suspected diversion or abuse of controlled substances.      Follow up in about 3 months (around 12/30/2024) for Medication Management.    Yefri Medrano, LUCHOP

## 2024-11-04 ENCOUNTER — OFFICE VISIT (OUTPATIENT)
Dept: OBSTETRICS AND GYNECOLOGY | Facility: CLINIC | Age: 39
End: 2024-11-04
Payer: MEDICAID

## 2024-11-04 VITALS
WEIGHT: 127.19 LBS | BODY MASS INDEX: 22.54 KG/M2 | HEIGHT: 63 IN | SYSTOLIC BLOOD PRESSURE: 120 MMHG | TEMPERATURE: 98 F | DIASTOLIC BLOOD PRESSURE: 76 MMHG

## 2024-11-04 DIAGNOSIS — B37.31 VAGINAL CANDIDIASIS: ICD-10-CM

## 2024-11-04 DIAGNOSIS — Z80.3 FAMILY HISTORY OF BREAST CANCER: ICD-10-CM

## 2024-11-04 DIAGNOSIS — N60.19 FIBROCYSTIC BREAST DISEASE (FCBD), UNSPECIFIED LATERALITY: ICD-10-CM

## 2024-11-04 DIAGNOSIS — Z12.4 SCREENING FOR MALIGNANT NEOPLASM OF THE CERVIX: ICD-10-CM

## 2024-11-04 DIAGNOSIS — N89.8 VAGINAL DISCHARGE: ICD-10-CM

## 2024-11-04 DIAGNOSIS — N89.8 VAGINA ITCHING: ICD-10-CM

## 2024-11-04 DIAGNOSIS — Z12.31 BREAST CANCER SCREENING BY MAMMOGRAM: ICD-10-CM

## 2024-11-04 DIAGNOSIS — Z98.51 HISTORY OF BILATERAL TUBAL LIGATION: ICD-10-CM

## 2024-11-04 DIAGNOSIS — Z01.419 WELL WOMAN EXAM WITH ROUTINE GYNECOLOGICAL EXAM: Primary | ICD-10-CM

## 2024-11-04 LAB
BACTERIA HYPHAE, POC: NEGATIVE
GARDNERELLA VAGINALIS: NEGATIVE
OTHER MICROSC. OBSERVATIONS: ABNORMAL
POC BACTERIAL VAGINOSIS: NEGATIVE
POC CLUE CELLS: NEGATIVE
TRICHOMONAS, POC: NEGATIVE
YEAST WET PREP: POSITIVE
YEAST, POC: POSITIVE

## 2024-11-04 PROCEDURE — 99395 PREV VISIT EST AGE 18-39: CPT | Mod: ,,,

## 2024-11-04 PROCEDURE — 87210 SMEAR WET MOUNT SALINE/INK: CPT | Mod: QW,,,

## 2024-11-04 PROCEDURE — 1160F RVW MEDS BY RX/DR IN RCRD: CPT | Mod: CPTII,,,

## 2024-11-04 PROCEDURE — 3074F SYST BP LT 130 MM HG: CPT | Mod: CPTII,,,

## 2024-11-04 PROCEDURE — 87625 HPV TYPES 16 & 18 ONLY: CPT

## 2024-11-04 PROCEDURE — 3008F BODY MASS INDEX DOCD: CPT | Mod: CPTII,,,

## 2024-11-04 PROCEDURE — 1159F MED LIST DOCD IN RCRD: CPT | Mod: CPTII,,,

## 2024-11-04 PROCEDURE — 87220 TISSUE EXAM FOR FUNGI: CPT | Mod: QW,,,

## 2024-11-04 PROCEDURE — 87625 HPV TYPES 16 & 18 ONLY: CPT | Mod: 59

## 2024-11-04 PROCEDURE — 87624 HPV HI-RISK TYP POOLED RSLT: CPT

## 2024-11-04 PROCEDURE — 3078F DIAST BP <80 MM HG: CPT | Mod: CPTII,,,

## 2024-11-04 RX ORDER — FLUCONAZOLE 200 MG/1
200 TABLET ORAL EVERY OTHER DAY
Qty: 4 TABLET | Refills: 0 | Status: SHIPPED | OUTPATIENT
Start: 2024-11-04 | End: 2024-11-11

## 2024-11-04 NOTE — PROGRESS NOTES
Chief Complaint:   Well Woman     History of Present Illness:  Nahomi Warren is a 39 y.o. year old  presents for her well woman exam. Currently relying on BTL for birth control. LMP 10/27/24. Having regular monthly cycles lasting 5 days with heavy bleeding the first day then light. Denies any irregular menstrual bleeding. Mild dysmenorrhea. H/o cervical cryotherapy in .    Patient is complaining vaginal itching and a possible yeast infection.  States symptoms started 2-3 days ago.  Patient is a type 2 diabetic, dx'd in .    Maternal grandmother with history of breast cancer diagnosed in her 60s.      Gyn History:  Menstrual History  Cycle: Yes (10/27/2024)  Menarche Age: 11 years  Flow Duration: 5  Flow: (!) Heavy (heavy x1 than light)  Interval: 28  Intermenstrual Bleeding: No  Dysmenorrhea: Yes  Dysmenorrhea Severity : Mild    Menopause  Menopause Age: 1 years    Pap History  Last pap date: 23  Result: Normal  History of Abnormal Pap: (!) Yes  Treatment used: Colpo ( Cyrotherapy benign)  HPV Vaccine Completed: No (0/3)    [place]  Sexually Active: Yes  Sexual Orientation: heterosexual  Postcoital Bleeding: No  Dyspareunia: No  STI History: Yes  STI Type: HSV - Genital (HPV)  Contraception: Yes  Contraception Type: Tubal ligation/salpingectony    Breast History  Last Breast Imaging Date: No  History of Breast Biopsy: No        Review of Systems:  General/Constitutional: Chills denies. Fatigue/weakness denies. Fever denies. Night sweats denies. Hot flashes denies    Respiratory: Cough denies. Hemoptysis denies. SOB denies. Sputum production denies. Wheezing denies .   Cardiovascular: Chest pain denies. Dizziness denies. Palpitations denies. Swelling in hands/feet denies.                Breast: Dimpling denies. Breast mass denies. Breast pain/tenderness denies. Nipple discharge denies. Puckering denies.  Gastrointestinal: Abdominal pain denies. Blood in stool denies. Constipation denies.  Diarrhea denies. Heartburn denies. Nausea denies. Vomiting denies    Genitourinary: Incontinence denies. Blood in urine denies. Frequent urination denies. Painful urination denies. Urinary urgency denies. Nocturia denies    Gynecologic: Irregular menses denies. Heavy bleeding admits x1 day Painful menses denies. Vaginal discharge denies. Vaginal odor denies. Vaginal itching admits. Vaginal lesion denies. Pelvic pain denies. Decreased libido denies. Vulvar lesion denies. Prolapse of genital organs denies. Painful intercourse denies. Postcoital bleeding denies    Psychiatric: Depression denies. Anxiety denies.     OB History    Para Term  AB Living   2 2 1 1 0 1   SAB IAB Ectopic Multiple Live Births   0 0 0 0 1      # 1 - Date: 04, Sex: Male, Weight: 3.232 kg (7 lb 2 oz), GA: None, Type: Vaginal, Spontaneous, Apgar1: None, Apgar5: None, Living: Living, Birth Comments: None    # 2 - Date: [birth year], Sex: Male, Weight: 2.863 kg (6 lb 5 oz), GA: 36w0d, Type: Vaginal, Spontaneous, Apgar1: None, Apgar5: None, Living: None, Birth Comments: None      Past Medical History:   Diagnosis Date    ADHD (attention deficit hyperactivity disorder)     Anemia     Anxiety     Depression     Diabetes mellitus     Elevated liver enzymes     Insomnia     Panic attack        Current Outpatient Medications:     DEXCOM G7 SENSOR Zara, 1 each by subcutaneous (via wearable injector) route every 14 (fourteen) days., Disp: , Rfl:     dextroamphetamine-amphetamine (ADDERALL) 30 mg Tab, Take 1 tablet (30 mg total) by mouth 2 (two) times a day., Disp: 60 tablet, Rfl: 0    EScitalopram oxalate (LEXAPRO) 20 MG tablet, Take 1 tablet (20 mg total) by mouth once daily. Take with 5mg tablet for total dose of 25mg/day, Disp: 30 tablet, Rfl: 2    EScitalopram oxalate (LEXAPRO) 5 MG Tab, Take 1 tablet (5 mg total) by mouth once daily. Take with 20mg tab for total dose of 25mg/day, Disp: 30 tablet, Rfl: 2    hydrOXYzine HCL (ATARAX) 10 MG  Tab, Take 1 tablet (10 mg total) by mouth every evening., Disp: 30 tablet, Rfl: 2    LANTUS SOLOSTAR U-100 INSULIN glargine 100 units/mL SubQ pen, Inject 6 Units into the skin nightly., Disp: , Rfl:     metFORMIN (GLUCOPHAGE-XR) 500 MG ER 24hr tablet, Take 500 mg by mouth 2 (two) times daily., Disp: , Rfl:     rosuvastatin (CRESTOR) 10 MG tablet, Take 10 mg by mouth every evening., Disp: , Rfl:     valACYclovir (VALTREX) 500 MG tablet, TAKE 1 TABLET BY MOUTH ONCE DAILY, Disp: 30 tablet, Rfl: 4    fluconazole (DIFLUCAN) 200 MG Tab, Take 1 tablet (200 mg total) by mouth every other day. for 4 doses, Disp: 4 tablet, Rfl: 0    Review of patient's allergies indicates:   Allergen Reactions    Sulfa (sulfonamide antibiotics) Other (See Comments)     unknown       Past Surgical History:   Procedure Laterality Date    CERVIX LESION DESTRUCTION  2011    TUBAL LIGATION Bilateral 10/28/2016     Family History   Problem Relation Name Age of Onset    Anxiety disorder Mother Celestina Warren     Depression Mother Celestina Warren     Hypertension Mother Celestina Warren     Kidney disease Mother Celestinaalyce Warren     Heart failure Mother Celestinaalyce Warren     Hypertension Father Rafiq Warren     Parkinsonism Father Rafiq Warren     Schizophrenia Sister Ksenia Simon     Personality disorder Sister Ksenia Warren     Depression Sister Ksenia Warren     Rashes / Skin problems Sister Ksenia Warren         Psoriasis    Seizures Sister Ksenialaura Warren     Hypertension Sister Ksenialaura Warren     Asthma Sister Ksenia Warren     No Known Problems Brother      No Known Problems Maternal Aunt      No Known Problems Paternal Aunt      No Known Problems Maternal Uncle      No Known Problems Paternal Uncle      No Known Problems Maternal Grandfather      Breast cancer Maternal Grandmother Shannon Sun     No Known Problems Paternal Grandfather      No Known Problems Paternal Grandmother      No Known Problems Cousin      No Known Problems Other      Colon  "cancer Neg Hx      Ovarian cancer Neg Hx      Uterine cancer Neg Hx      Cervical cancer Neg Hx       Social History     Socioeconomic History    Marital status: Significant Other     Spouse name: Luis Miguel Salazar    Number of children: 4   Tobacco Use    Smoking status: Former     Types: Cigarettes, Vaping with nicotine    Smokeless tobacco: Never   Substance and Sexual Activity    Alcohol use: Yes     Comment: monthly    Drug use: Never    Sexual activity: Yes     Partners: Male     Birth control/protection: Other-see comments     Comment: Tubal   Social History Narrative    Luis Miguel Salazar-fiance       Physical Exam:  /76 (BP Location: Left arm, Patient Position: Sitting)   Temp 97.9 °F (36.6 °C) (Temporal)   Ht 5' 3" (1.6 m)   Wt 57.7 kg (127 lb 3.2 oz)   LMP 10/27/2024 (Exact Date)   BMI 22.53 kg/m²     Chaperone: present.       General appearance: healthy, well-nourished and well-developed     Psychiatric: Orientation to time, place and person. Normal mood and affect and active, alert     Skin:   Appearance: no rashes or lesions.     Neck:   Neck: supple, FROM, trachea midline. and no masses   Thyroid: no enlargement or nodules and non-tender.       Cardiovascular:   Auscultation: RRR and no murmur.   Peripheral Vascular: no varicosities, LLE edema, RLE edema, calf tenderness, and palpable cord and pedal pulses intact.     Lungs:   Respiratory effort: no intercostal retractions or accessory muscle usage.   Auscultation: no wheezing, rales/crackles, or rhonchi and clear to auscultation.     Breast:   Inspection/Palpation: no tenderness, discrete/distinct masses, skin changes, or abnormal secretions. Nipple appearance normal. +FBD    Abdomen:   Auscultation/Inspection/Palpation: no hepatomegaly, splenomegaly, masses, tenderness or CVA tenderness and soft, non-distended bowel sounds preset.    Hernia: no palpable hernias.     Female Genitalia:   Vulva: no masses, tenderness or lesions   Bladder/Urethra: no " urethral discharge or mass, normal meatus, bladder non-distended.   Vagina: no tenderness, erythema, cystocele, rectocele, or vesicle(s) or ulcers , +large amount of white, thick discharge  Cervix: no discharge, no cervical lacerations noted or motion tenderness and grossly normal   Uterus: normal size and shape and midline, non-tender, and no uterine prolapse.   Adnexa/Parametria: no parametrial tenderness or mass, no adnexal tenderness or ovarian mass.     Lymph Nodes:   Palpation: non tender submandibular nodes, axillary nodes, or inguinal nodes.     Rectal Exam:   Rectum: normal perianal skin.      Wet prep: +yeast    Assessment/Plan:  1. Well woman exam with routine gynecological exam  Pap  Recommend BSE monthly  Discussed recommendations of annual screening after age of 40 with mammogram    Explained that screening is not 100% reliable. Advised patient if she notices any changes to her breast including a lump, mass, dimpling, discharge, rash, or tenderness she should contact us immediately.     Healthy diet, exercise   Multivitamin   Seat belt   Sunscreen use   Safe sex/ STI education   Contraception evaluation: BTL   Gardasil evaluation: 0/3    2. Vagina itching  -     POCT Wet Prep  -     POCT KOH    3. Vaginal candidiasis  -     fluconazole (DIFLUCAN) 200 MG Tab; Take 1 tablet (200 mg total) by mouth every other day. for 4 doses  Dispense: 4 tablet; Refill: 0    Wet prep: yeast  Educated   Diflucan 200mg PO q 48hrs x4  Call office if no improvement in 72 hours     AVOID: Scented soaps or shampoos, bubble bath, scented detergents, feminine sprays, douches, powders          Fragrance-free pH neutral soap     Unscented detergents       4. Vaginal discharge  -     POCT Wet Prep  -     POCT KOH    5. Fibrocystic breast disease (FCBD), unspecified laterality  Discussed limiting caffeine intake. NSAIDS for pain, compression bra. Instructed to do self breast exams. Educated on normal findings. Instructed to call  office if no improvement in 1 month.     6. Family history of breast cancer    7. History of bilateral tubal ligation    8. Breast cancer screening by mammogram  -     Mammo Digital Screening Bilat w/ Edd; Future; Expected date: 04/07/2025    9. Screening for malignant neoplasm of the cervix  -     Liquid-Based Pap Smear, Screening         RTC if no improvement in symptoms, p.r.n., or for annual.

## 2024-11-07 LAB
HIGH RISK HPV 16: NEGATIVE
HIGH RISK HPV 18/45: NEGATIVE
HIGH RISK HPV: POSITIVE
PSYCHE PATHOLOGY RESULT: NORMAL

## 2024-11-08 NOTE — PROGRESS NOTES
Called patient and relayed Ariana's message. Patient agrees with plan of care and she was transferred to front staff for an appt.     She verbalized a clear understanding.

## 2024-11-11 ENCOUNTER — OFFICE VISIT (OUTPATIENT)
Dept: FAMILY MEDICINE | Facility: CLINIC | Age: 39
End: 2024-11-11
Payer: MEDICAID

## 2024-11-11 VITALS
SYSTOLIC BLOOD PRESSURE: 110 MMHG | HEIGHT: 63 IN | WEIGHT: 122 LBS | HEART RATE: 72 BPM | DIASTOLIC BLOOD PRESSURE: 68 MMHG | TEMPERATURE: 98 F | BODY MASS INDEX: 21.62 KG/M2 | OXYGEN SATURATION: 99 %

## 2024-11-11 DIAGNOSIS — E11.9 TYPE 2 DIABETES MELLITUS WITHOUT COMPLICATION, WITH LONG-TERM CURRENT USE OF INSULIN: ICD-10-CM

## 2024-11-11 DIAGNOSIS — Z79.4 TYPE 2 DIABETES MELLITUS WITHOUT COMPLICATION, WITH LONG-TERM CURRENT USE OF INSULIN: ICD-10-CM

## 2024-11-11 DIAGNOSIS — M79.644 FINGER PAIN, RIGHT: Primary | ICD-10-CM

## 2024-11-11 PROCEDURE — 3074F SYST BP LT 130 MM HG: CPT | Mod: CPTII,,, | Performed by: NURSE PRACTITIONER

## 2024-11-11 PROCEDURE — 3008F BODY MASS INDEX DOCD: CPT | Mod: CPTII,,, | Performed by: NURSE PRACTITIONER

## 2024-11-11 PROCEDURE — 99215 OFFICE O/P EST HI 40 MIN: CPT | Mod: ,,, | Performed by: NURSE PRACTITIONER

## 2024-11-11 PROCEDURE — 1160F RVW MEDS BY RX/DR IN RCRD: CPT | Mod: CPTII,,, | Performed by: NURSE PRACTITIONER

## 2024-11-11 PROCEDURE — 3078F DIAST BP <80 MM HG: CPT | Mod: CPTII,,, | Performed by: NURSE PRACTITIONER

## 2024-11-11 PROCEDURE — 1159F MED LIST DOCD IN RCRD: CPT | Mod: CPTII,,, | Performed by: NURSE PRACTITIONER

## 2024-11-11 RX ORDER — INSULIN LISPRO 100 [IU]/ML
INJECTION, SOLUTION INTRAVENOUS; SUBCUTANEOUS
COMMUNITY
Start: 2024-10-09

## 2024-11-11 RX ORDER — MELOXICAM 15 MG/1
15 TABLET ORAL DAILY
Qty: 30 TABLET | Refills: 0 | Status: SHIPPED | OUTPATIENT
Start: 2024-11-11

## 2024-11-14 ENCOUNTER — TELEPHONE (OUTPATIENT)
Dept: FAMILY MEDICINE | Facility: CLINIC | Age: 39
End: 2024-11-14
Payer: MEDICAID

## 2024-11-14 NOTE — TELEPHONE ENCOUNTER
----- Message from CHRISTOPHER Hickey sent at 11/13/2024 12:24 PM CST -----  Results are within acceptable ranges for age and conditions. No change in treatment needed at this time. Follow up as scheduled.

## 2024-11-18 ENCOUNTER — TELEPHONE (OUTPATIENT)
Dept: FAMILY MEDICINE | Facility: CLINIC | Age: 39
End: 2024-11-18
Payer: MEDICAID

## 2024-11-18 NOTE — TELEPHONE ENCOUNTER
Spoke with patient, she said the pain is getting worse , it is waking her up at night and hurt all day yesterday, she has a virtual appointment Wednesday, I offered her an appointment with Nai or suggested the ER if the pain was that unbearable, Patient declined both, stating that she would wait for virtual Wednesday.

## 2024-11-20 ENCOUNTER — OFFICE VISIT (OUTPATIENT)
Dept: FAMILY MEDICINE | Facility: CLINIC | Age: 39
End: 2024-11-20
Payer: MEDICAID

## 2024-11-20 VITALS — HEIGHT: 63 IN | WEIGHT: 121.94 LBS | BODY MASS INDEX: 21.61 KG/M2

## 2024-11-20 DIAGNOSIS — M79.644 FINGER PAIN, RIGHT: Primary | ICD-10-CM

## 2024-11-20 DIAGNOSIS — M79.601 RIGHT ARM PAIN: ICD-10-CM

## 2024-11-20 PROCEDURE — 1160F RVW MEDS BY RX/DR IN RCRD: CPT | Mod: CPTII,95,, | Performed by: NURSE PRACTITIONER

## 2024-11-20 PROCEDURE — 99213 OFFICE O/P EST LOW 20 MIN: CPT | Mod: 95,,, | Performed by: NURSE PRACTITIONER

## 2024-11-20 PROCEDURE — 3008F BODY MASS INDEX DOCD: CPT | Mod: CPTII,95,, | Performed by: NURSE PRACTITIONER

## 2024-11-20 PROCEDURE — 1159F MED LIST DOCD IN RCRD: CPT | Mod: CPTII,95,, | Performed by: NURSE PRACTITIONER

## 2024-11-20 RX ORDER — INDOMETHACIN 50 MG/1
50 CAPSULE ORAL EVERY 8 HOURS PRN
Qty: 60 CAPSULE | Refills: 0 | Status: SHIPPED | OUTPATIENT
Start: 2024-11-20

## 2024-11-20 NOTE — PROGRESS NOTES
Patient ID: Nahomi Warren  : 1985     Chief Complaint: Hand Pain    Allergies: Patient is allergic to sulfa (sulfonamide antibiotics).     This is a telemedicine note. Patient was treated using telemedicine, real time audio and video, according to [facility] protocols. ICarrie, conducted the visit from the [facility]. The patient participated in the visit at a non-[facility] location selected by the patient. I am licensed in the state where the patient stated they are located. The patient stated that they understood and accepted the privacy and security risks to their information at their location. The patient verbally consented to proceed with a telemedicine visit. This visit is not recorded. Patient was located at the patient's home.     Video Time Documentation:  Time of call:   Spent 8  minutes with patient face to face discussed health concerns.     History of Present Illness:  The patient is a 39 y.o. White female who scheduled telemedicine visit for chief complaint of Hand Pain   She's following up on finger pain which is still present.  She is also complaining of numbness in that finger that radiates up to her elbow.  She has also had 1 day in the last week where her thumb was also swollen but has since gone down. 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  She notes that her mother had gout.  She does drink alcohol on a regular basis.     Past Medical History:  has a past medical history of ADHD (attention deficit hyperactivity disorder), Anemia, Anxiety, Depression, Diabetes mellitus, Elevated liver enzymes, Insomnia, and Panic attack.    Social History:  reports that she quit smoking about 5 months ago. Her smoking use included cigarettes and vaping with nicotine. She started smoking about 16 years ago. She has a 7.9 pack-year smoking history. She has never used smokeless tobacco. She reports current alcohol use. She reports that she  does not use drugs.    Family History: family history includes Anxiety disorder in her mother; Asthma in her sister; Breast cancer in her maternal grandmother; Depression in her mother and sister; Heart failure in her mother; Hypertension in her father, mother, and sister; Kidney disease in her mother; No Known Problems in her brother, cousin, maternal aunt, maternal grandfather, maternal uncle, paternal aunt, paternal grandfather, paternal grandmother, paternal uncle, and another family member; Parkinsonism in her father; Personality disorder in her sister; Rashes / Skin problems in her sister; Schizophrenia in her sister; Seizures in her sister.    Care Team: Patient Care Team:  Carrie Hawkins FNP-C as PCP - General (Family Medicine)  Miguel Zavala MD as Consulting Physician (Endocrinology)  Hossein García MD as Consulting Physician (Obstetrics and Gynecology)     Current Medications:  Current Outpatient Medications   Medication Instructions    DEXCOM G7 SENSOR Zara 1 each, Every 14 days    dextroamphetamine-amphetamine (ADDERALL) 30 mg Tab 30 mg, Oral, 2 times daily    EScitalopram oxalate (LEXAPRO) 20 mg, Oral, Daily, Take with 5mg tablet for total dose of 25mg/day    EScitalopram oxalate (LEXAPRO) 5 mg, Oral, Daily, Take with 20mg tab for total dose of 25mg/day    HUMALOG KWIKPEN INSULIN 100 unit/mL pen Inject into the skin.    hydrOXYzine HCL (ATARAX) 10 mg, Oral, Nightly    indomethacin (INDOCIN) 50 mg, Oral, Every 8 hours PRN    LANTUS SOLOSTAR U-100 INSULIN 6 Units, Nightly    metFORMIN (GLUCOPHAGE-XR) 500 mg, 2 times daily    rosuvastatin (CRESTOR) 10 mg, Nightly    valACYclovir (VALTREX) 500 mg, Oral       Patient is allergic to sulfa (sulfonamide antibiotics).     Review of Systems   Constitutional:  Negative for activity change and unexpected weight change.   HENT:  Negative for hearing loss, rhinorrhea and trouble swallowing.    Eyes:  Negative for discharge and visual disturbance.   Respiratory:   "Negative for chest tightness and wheezing.    Cardiovascular:  Negative for chest pain and palpitations.   Gastrointestinal:  Negative for blood in stool, constipation, diarrhea and vomiting.   Endocrine: Negative for polydipsia and polyuria.   Genitourinary:  Negative for difficulty urinating, dysuria, hematuria and menstrual problem.   Musculoskeletal:  Positive for arthralgias and joint swelling. Negative for neck pain.   Neurological:  Negative for weakness and headaches.   Psychiatric/Behavioral:  Negative for confusion and dysphoric mood.         Visit Vitals  Ht 5' 2.99" (1.6 m)   Wt 55.3 kg (121 lb 14.6 oz)   LMP 10/27/2024 (Exact Date)   BMI 21.60 kg/m²       Physical Exam: LIMITED DUE TO TELEMEDICINE RESTRICTIONS.  General: Well nourished, Alert and oriented, No acute distress.  ENMT: Sclera non-icteric, conversational hearing normal.  Respiratory: Non-labored respirations, no dyspnea with conversation.  Integumentary:  No visible suspicious lesions or rashes. No diaphoresis noted.   Psychiatric: Normal interaction, Coherent speech, Euthymic mood, Appropriate affect     Assessment & Plan:  1. Finger pain, right  Comments:  check labs, get EMG  stop meloxicam, start indomethacin  follow up prn  Orders:  -     CBC Auto Differential; Future; Expected date: 11/20/2024  -     Comprehensive Metabolic Panel; Future; Expected date: 11/20/2024  -     Sedimentation rate; Future; Expected date: 11/20/2024  -     C-Reactive Protein; Future; Expected date: 01/01/2025  -     Uric Acid; Future; Expected date: 11/20/2024  -     Hemoglobin A1C; Future; Expected date: 11/20/2024  -     ANTINUCLEAR ANTIBODIES; Future; Expected date: 11/20/2024  -     Rheumatoid Factor IgA; Future; Expected date: 11/20/2024  -     Rheumatoid Factor IgM; Future; Expected date: 11/20/2024  -     Rheumatoid Quantitative; Future; Expected date: 11/20/2024  -     Ambulatory referral/consult to Neurology; Future; Expected date: 11/27/2024    2. " Right arm pain  -     Ambulatory referral/consult to Neurology; Future; Expected date: 11/27/2024    Other orders  -     indomethacin (INDOCIN) 50 MG capsule; Take 1 capsule (50 mg total) by mouth every 8 (eight) hours as needed (pain/inflammation).  Dispense: 60 capsule; Refill: 0       Future Appointments   Date Time Provider Department Center   11/26/2024 10:40 AM Hossein García MD [facility]   1/13/2025  9:30 AM [facility]   1/24/2025  8:40 AM LAB, [facility] LABORATORY DRAW STATION [facility]   1/30/2025 10:00 AM Carrie Hawkins FNP-C [facility]   11/10/2025  9:00 AM Ariana Melton WHNP [facility]       Follow up if symptoms worsen or fail to improve. Call sooner if needed.    Lab Frequency Next Occurrence   Mammo Digital Screening Bilat w/ Edd Once 04/07/2025   CBC Auto Differential Once 11/20/2024   Comprehensive Metabolic Panel Once 11/20/2024   Sedimentation rate Once 11/20/2024   C-Reactive Protein Once 01/01/2025   Uric Acid Once 11/20/2024   Hemoglobin A1C Once 11/20/2024        CHRISTOPHER Dao

## 2024-11-21 ENCOUNTER — PATIENT MESSAGE (OUTPATIENT)
Dept: FAMILY MEDICINE | Facility: CLINIC | Age: 39
End: 2024-11-21
Payer: MEDICAID

## 2024-11-21 ENCOUNTER — LAB VISIT (OUTPATIENT)
Dept: LAB | Facility: HOSPITAL | Age: 39
End: 2024-11-21
Attending: NURSE PRACTITIONER
Payer: MEDICAID

## 2024-11-21 DIAGNOSIS — M79.644 FINGER PAIN, RIGHT: ICD-10-CM

## 2024-11-21 LAB
ALBUMIN SERPL-MCNC: 4 G/DL (ref 3.5–5)
ALBUMIN/GLOB SERPL: 1.3 RATIO (ref 1.1–2)
ALP SERPL-CCNC: 52 UNIT/L (ref 40–150)
ALT SERPL-CCNC: 28 UNIT/L (ref 0–55)
ANION GAP SERPL CALC-SCNC: 8 MEQ/L
AST SERPL-CCNC: 30 UNIT/L (ref 5–34)
BASOPHILS # BLD AUTO: 0.03 X10(3)/MCL
BASOPHILS NFR BLD AUTO: 0.3 %
BILIRUB SERPL-MCNC: 0.4 MG/DL
BUN SERPL-MCNC: 12 MG/DL (ref 7–18.7)
CALCIUM SERPL-MCNC: 8.7 MG/DL (ref 8.4–10.2)
CHLORIDE SERPL-SCNC: 102 MMOL/L (ref 98–107)
CO2 SERPL-SCNC: 28 MMOL/L (ref 22–29)
CREAT SERPL-MCNC: 0.88 MG/DL (ref 0.55–1.02)
CREAT/UREA NIT SERPL: 14
EOSINOPHIL # BLD AUTO: 0.17 X10(3)/MCL (ref 0–0.9)
EOSINOPHIL NFR BLD AUTO: 1.8 %
ERYTHROCYTE [DISTWIDTH] IN BLOOD BY AUTOMATED COUNT: 12.2 % (ref 11.5–17)
ERYTHROCYTE [SEDIMENTATION RATE] IN BLOOD: 24 MM/HR (ref 0–20)
EST. AVERAGE GLUCOSE BLD GHB EST-MCNC: 134.1 MG/DL
GFR SERPLBLD CREATININE-BSD FMLA CKD-EPI: >60 ML/MIN/1.73/M2
GLOBULIN SER-MCNC: 3.1 GM/DL (ref 2.4–3.5)
GLUCOSE SERPL-MCNC: 186 MG/DL (ref 74–100)
HBA1C MFR BLD: 6.3 %
HCT VFR BLD AUTO: 33.7 % (ref 37–47)
HGB BLD-MCNC: 11.2 G/DL (ref 12–16)
IMM GRANULOCYTES # BLD AUTO: 0.04 X10(3)/MCL (ref 0–0.04)
IMM GRANULOCYTES NFR BLD AUTO: 0.4 %
LYMPHOCYTES # BLD AUTO: 1.97 X10(3)/MCL (ref 0.6–4.6)
LYMPHOCYTES NFR BLD AUTO: 20.8 %
MCH RBC QN AUTO: 33.4 PG (ref 27–31)
MCHC RBC AUTO-ENTMCNC: 33.2 G/DL (ref 33–36)
MCV RBC AUTO: 100.6 FL (ref 80–94)
MONOCYTES # BLD AUTO: 0.47 X10(3)/MCL (ref 0.1–1.3)
MONOCYTES NFR BLD AUTO: 5 %
NEUTROPHILS # BLD AUTO: 6.81 X10(3)/MCL (ref 2.1–9.2)
NEUTROPHILS NFR BLD AUTO: 71.7 %
PLATELET # BLD AUTO: 328 X10(3)/MCL (ref 130–400)
PMV BLD AUTO: 10.3 FL (ref 7.4–10.4)
POTASSIUM SERPL-SCNC: 4.7 MMOL/L (ref 3.5–5.1)
PROT SERPL-MCNC: 7.1 GM/DL (ref 6.4–8.3)
RBC # BLD AUTO: 3.35 X10(6)/MCL (ref 4.2–5.4)
SODIUM SERPL-SCNC: 138 MMOL/L (ref 136–145)
URATE SERPL-MCNC: 4.1 MG/DL (ref 2.6–6)
WBC # BLD AUTO: 9.49 X10(3)/MCL (ref 4.5–11.5)

## 2024-11-21 PROCEDURE — 86431 RHEUMATOID FACTOR QUANT: CPT | Mod: 59

## 2024-11-21 PROCEDURE — 86431 RHEUMATOID FACTOR QUANT: CPT

## 2024-11-21 PROCEDURE — 86039 ANTINUCLEAR ANTIBODIES (ANA): CPT

## 2024-11-21 PROCEDURE — 80053 COMPREHEN METABOLIC PANEL: CPT

## 2024-11-21 PROCEDURE — 85652 RBC SED RATE AUTOMATED: CPT

## 2024-11-21 PROCEDURE — 36415 COLL VENOUS BLD VENIPUNCTURE: CPT

## 2024-11-21 PROCEDURE — 83036 HEMOGLOBIN GLYCOSYLATED A1C: CPT

## 2024-11-21 PROCEDURE — 84550 ASSAY OF BLOOD/URIC ACID: CPT

## 2024-11-21 PROCEDURE — 85025 COMPLETE CBC W/AUTO DIFF WBC: CPT

## 2024-11-22 LAB
ANTINUCLEAR ANTIBODY SCREEN (OHS): NEGATIVE
DSDNA AB QUANT (OHS): 1.1 IU/ML
RHEUMATOID FACT SERPL-ACNC: <13 IU/ML
RHEUMATOID FACTOR IGA QUANTITATIVE (OLG): 2.8 IU/ML
RHEUMATOID FACTOR IGM QUANTITATIVE (OLG): 4.2 IU/ML

## 2024-11-22 NOTE — PROGRESS NOTES
PROCEDURE NOTE    Procedure: Colposcopy    Pre-Procedure Care:   The correct patient was identified. The procedure and risks were explained. Questions were answered. Written informed consent was obtained. The correct side and site were confirmed.    Colposcopy    Indication for Procedure:   39 y.o. female  presents for colposcopy secondary to  LSIL with Positive High risk HPV, negative 16, negative  18/45 cotesting on 24 .     Previous pap date: 23  Result: Normal  History of Abnormal Pap: (!) Yes  Treatment used: Colpo ( Cyrotherapy benign)  HPV Vaccine Completed: No (0/3)        Gyn History:    Menstrual History  Cycle: Yes  Menarche Age: 11 years  Flow Duration: 5  Flow: (!) Heavy  Interval: 28  Intermenstrual Bleeding: No  Dysmenorrhea: Yes  Dysmenorrhea Severity : Mild    Menopause  Menopause Age: 1 years    Pap History  Last pap date: 24  Result: (!) Abnormal (LGSIL + HPV)  History of Abnormal Pap: (!) Yes  Treatment used: Colpo (Cryotherapy in )  HPV Vaccine Completed: No (0/3)    [place]  Sexually Active: Yes  Sexual Orientation: heterosexual  Postcoital Bleeding: No  Dyspareunia: No  STI History: Yes  STI Type: HSV - Genital  Contraception: Yes  Contraception Type: Tubal ligation/salpingectony    Breast History  Last Breast Imaging Date: No  History of Breast Biopsy: No          OB History    Para Term  AB Living   2 2 1 1   1   SAB IAB Ectopic Multiple Live Births           1      # Outcome Date GA Lbr Raymon/2nd Weight Sex Type Anes PTL Lv   2  11 36w0d  2.863 kg (6 lb 5 oz) M Vag-Spont EPI Y    1 Term 04   3.232 kg (7 lb 2 oz) M Vag-Spont EPI  MEGHAN       /74   Pulse (P) 80   Resp (P) 18   Wt 54.9 kg (121 lb)   LMP 10/27/2024 (Exact Date)   BMI 21.44 kg/m²       Procedure in Detail:   Constitutional: General appearance: healthy, well-nourished and well-developed  Female Genitalia:  Vulva: no masses, atrophy or  lesions  Bladder/Urethra: no urethral discharge or mass, normal meatus, bladder   non-distended.  Vagina: no tenderness, erythema, cystocele, rectocele, abnormal  vaginal discharge, or vesicle(s) or ulcers   Cervix: no discharge or cervical motion tenderness and grossly normal    Colposcopy procedure fully reviewed. Patient questions regarding procedure and diagnosis answered. Consent was signed. A speculum was placed into the vagina. The cervix and vagina were painted with acetic acid solution.     The entire T zone was visualized.    Colposcopy revealed the following:  Acetowhite lesions absent  Mosaicism absent  Punctate lesions absent  Abnormal vessels absent  Leukoplakia absent  A cervical biopsy was not performed.   An ECC was performed.  Hemostasis observed  Patient tolerated procedure well.      Condition:   Stable. Patient tolerated procedure well.     Complications: None      Assessment:   1. LGSIL on Pap smear of cervix  -     POCT urine pregnancy    2. High risk HPV infection  -     POCT urine pregnancy          Plan:     Consent given, ECC preformed, tolerated well.  - Advised that mild vaginal discharge may occur for 24hrs and spotting for 48hrs.  - Patient to report passage of clots, onset of profuse bleeding, foul vaginal odor, fever and/or pelvic pain.    If DANIELE I or benign will repeat PAP with HPV cotesting in 12 months. If DANIELE II or DANIELE III, will RTC for further discussion and POC.     RTC pending results     This note was transcribed by Usha Glass. There may be transcription errors as a result, however minimal. Effort has been made to ensure accuracy of transcription, but any obvious errors or omissions should be clarified with the author of the document.       I agree with the above documentation.

## 2024-11-26 ENCOUNTER — TELEPHONE (OUTPATIENT)
Dept: FAMILY MEDICINE | Facility: CLINIC | Age: 39
End: 2024-11-26
Payer: MEDICAID

## 2024-11-26 ENCOUNTER — OFFICE VISIT (OUTPATIENT)
Dept: OBSTETRICS AND GYNECOLOGY | Facility: CLINIC | Age: 39
End: 2024-11-26
Payer: MEDICAID

## 2024-11-26 VITALS
HEART RATE: 91 BPM | BODY MASS INDEX: 21.44 KG/M2 | WEIGHT: 121 LBS | SYSTOLIC BLOOD PRESSURE: 118 MMHG | DIASTOLIC BLOOD PRESSURE: 68 MMHG | RESPIRATION RATE: 18 BRPM

## 2024-11-26 DIAGNOSIS — B00.9 HSV (HERPES SIMPLEX VIRUS) INFECTION: ICD-10-CM

## 2024-11-26 DIAGNOSIS — R87.612 LGSIL ON PAP SMEAR OF CERVIX: Primary | ICD-10-CM

## 2024-11-26 DIAGNOSIS — B97.7 HIGH RISK HPV INFECTION: ICD-10-CM

## 2024-11-26 DIAGNOSIS — M79.644 FINGER PAIN, RIGHT: Primary | ICD-10-CM

## 2024-11-26 LAB
B-HCG UR QL: NEGATIVE
CTP QC/QA: YES

## 2024-11-26 PROCEDURE — 57456 ENDOCERV CURETTAGE W/SCOPE: CPT | Mod: ,,, | Performed by: OBSTETRICS & GYNECOLOGY

## 2024-11-26 PROCEDURE — 81025 URINE PREGNANCY TEST: CPT | Mod: ,,, | Performed by: OBSTETRICS & GYNECOLOGY

## 2024-11-26 PROCEDURE — 99499 UNLISTED E&M SERVICE: CPT | Mod: ,,, | Performed by: OBSTETRICS & GYNECOLOGY

## 2024-11-26 RX ORDER — VALACYCLOVIR HYDROCHLORIDE 500 MG/1
500 TABLET, FILM COATED ORAL DAILY
Qty: 30 TABLET | Refills: 11 | Status: SHIPPED | OUTPATIENT
Start: 2024-11-26 | End: 2024-12-26

## 2024-11-26 NOTE — TELEPHONE ENCOUNTER
----- Message from CHRISTOPHER Hickey sent at 11/25/2024  6:46 AM CST -----  Please inform patient of results:    Some inflammatory markers are elevated.  This does not prove that she has an autoimmune condition like RA but should allow us to refer her to rheumatology for further evaluation.

## 2024-12-03 DIAGNOSIS — F90.9 ATTENTION DEFICIT HYPERACTIVITY DISORDER (ADHD), UNSPECIFIED ADHD TYPE: ICD-10-CM

## 2024-12-03 RX ORDER — DEXTROAMPHETAMINE SACCHARATE, AMPHETAMINE ASPARTATE, DEXTROAMPHETAMINE SULFATE AND AMPHETAMINE SULFATE 7.5; 7.5; 7.5; 7.5 MG/1; MG/1; MG/1; MG/1
30 TABLET ORAL 2 TIMES DAILY
Qty: 60 TABLET | Refills: 0 | Status: SHIPPED | OUTPATIENT
Start: 2024-12-03 | End: 2025-01-02

## 2024-12-10 DIAGNOSIS — M79.601 RIGHT ARM PAIN: Primary | ICD-10-CM

## 2024-12-10 RX ORDER — MELOXICAM 15 MG/1
15 TABLET ORAL DAILY
Qty: 30 TABLET | Refills: 0 | Status: SHIPPED | OUTPATIENT
Start: 2024-12-10

## 2024-12-27 DIAGNOSIS — Z00.00 ENCOUNTER FOR ADULT WELLNESS VISIT: Primary | ICD-10-CM

## 2025-01-09 ENCOUNTER — TELEPHONE (OUTPATIENT)
Dept: BEHAVIORAL HEALTH | Facility: CLINIC | Age: 40
End: 2025-01-09
Payer: MEDICAID

## 2025-01-09 DIAGNOSIS — F33.1 MAJOR DEPRESSIVE DISORDER, RECURRENT EPISODE, MODERATE: ICD-10-CM

## 2025-01-09 RX ORDER — ESCITALOPRAM OXALATE 5 MG/1
5 TABLET ORAL DAILY
Qty: 30 TABLET | Refills: 0 | Status: SHIPPED | OUTPATIENT
Start: 2025-01-09

## 2025-01-09 RX ORDER — ESCITALOPRAM OXALATE 20 MG/1
20 TABLET ORAL DAILY
Qty: 30 TABLET | Refills: 0 | Status: SHIPPED | OUTPATIENT
Start: 2025-01-09

## 2025-01-13 ENCOUNTER — OFFICE VISIT (OUTPATIENT)
Dept: BEHAVIORAL HEALTH | Facility: CLINIC | Age: 40
End: 2025-01-13
Payer: MEDICAID

## 2025-01-13 VITALS
DIASTOLIC BLOOD PRESSURE: 82 MMHG | HEART RATE: 70 BPM | SYSTOLIC BLOOD PRESSURE: 130 MMHG | HEIGHT: 63 IN | BODY MASS INDEX: 23.4 KG/M2 | WEIGHT: 132.06 LBS | TEMPERATURE: 98 F | OXYGEN SATURATION: 100 %

## 2025-01-13 DIAGNOSIS — F90.9 ATTENTION DEFICIT HYPERACTIVITY DISORDER (ADHD), UNSPECIFIED ADHD TYPE: ICD-10-CM

## 2025-01-13 DIAGNOSIS — F41.1 GENERALIZED ANXIETY DISORDER: ICD-10-CM

## 2025-01-13 DIAGNOSIS — G47.00 INSOMNIA, UNSPECIFIED TYPE: ICD-10-CM

## 2025-01-13 DIAGNOSIS — F33.1 MAJOR DEPRESSIVE DISORDER, RECURRENT EPISODE, MODERATE: Primary | ICD-10-CM

## 2025-01-13 PROCEDURE — 1160F RVW MEDS BY RX/DR IN RCRD: CPT | Mod: CPTII,,, | Performed by: NURSE PRACTITIONER

## 2025-01-13 PROCEDURE — 1159F MED LIST DOCD IN RCRD: CPT | Mod: CPTII,,, | Performed by: NURSE PRACTITIONER

## 2025-01-13 PROCEDURE — 3075F SYST BP GE 130 - 139MM HG: CPT | Mod: CPTII,,, | Performed by: NURSE PRACTITIONER

## 2025-01-13 PROCEDURE — 3008F BODY MASS INDEX DOCD: CPT | Mod: CPTII,,, | Performed by: NURSE PRACTITIONER

## 2025-01-13 PROCEDURE — 99214 OFFICE O/P EST MOD 30 MIN: CPT | Mod: SA,HB,, | Performed by: NURSE PRACTITIONER

## 2025-01-13 PROCEDURE — 3079F DIAST BP 80-89 MM HG: CPT | Mod: CPTII,,, | Performed by: NURSE PRACTITIONER

## 2025-01-13 RX ORDER — DEXTROAMPHETAMINE SACCHARATE, AMPHETAMINE ASPARTATE, DEXTROAMPHETAMINE SULFATE AND AMPHETAMINE SULFATE 7.5; 7.5; 7.5; 7.5 MG/1; MG/1; MG/1; MG/1
30 TABLET ORAL 2 TIMES DAILY
Qty: 60 TABLET | Refills: 0 | Status: SHIPPED | OUTPATIENT
Start: 2025-01-13 | End: 2025-02-12

## 2025-01-13 RX ORDER — HYDROXYZINE HYDROCHLORIDE 10 MG/1
10 TABLET, FILM COATED ORAL NIGHTLY
Qty: 30 TABLET | Refills: 2 | Status: SHIPPED | OUTPATIENT
Start: 2025-01-13

## 2025-01-13 RX ORDER — ESCITALOPRAM OXALATE 20 MG/1
20 TABLET ORAL DAILY
Qty: 30 TABLET | Refills: 2 | Status: SHIPPED | OUTPATIENT
Start: 2025-01-13

## 2025-01-13 RX ORDER — ESCITALOPRAM OXALATE 5 MG/1
5 TABLET ORAL DAILY
Qty: 30 TABLET | Refills: 2 | Status: SHIPPED | OUTPATIENT
Start: 2025-01-13

## 2025-01-13 NOTE — PROGRESS NOTES
"PSYCHIATRIC FOLLOW-UP VISIT NOTE    Chief Complaint   Patient presents with    Medication Management     3 month medication management         History of Present Illness  39 y.o. year old White female with hx of MDD, nomi, ADHD, and insomnia seen today for follow-up appointment and medication management.  Patient reports that she has been doing well since last visit.  Has a good holiday season at home with her family and friends.  Denies any recent depression or anxiety.  Good focus and concentration with current dosage of Adderall.  Attending to responsibilities at work and at home without significant difficulty.  Does report that her significant other's children has been having some issues with emotional regulation.  Otherwise she has no acute complaints.  I did recommend some resources she can use to explore some possible causes behind this and possible solutions.  Patient denies any side effects from current medication regimen. Patient denies SI/HI. Denies hallucinations and does not appear to be responding to internal stimuli or be internally preoccupied. No manic symptoms noted.       Objective:     Vitals:  Vitals:    01/13/25 0928   BP: 130/82   BP Location: Right arm   Patient Position: Sitting   Pulse: 70   Temp: 98.4 °F (36.9 °C)   TempSrc: Temporal   SpO2: 100%   Weight: 59.9 kg (132 lb 0.9 oz)   Height: 5' 2.99" (1.6 m)       Wt Readings from Last 3 Encounters:   01/13/25 0928 59.9 kg (132 lb 0.9 oz)   11/26/24 1133 54.9 kg (121 lb)   11/20/24 1516 55.3 kg (121 lb 14.6 oz)         Medication:    Current Outpatient Medications:     DEXCOM G7 SENSOR Zara, 1 each by subcutaneous (via wearable injector) route every 14 (fourteen) days., Disp: , Rfl:     HUMALOG KWIKPEN INSULIN 100 unit/mL pen, Inject into the skin., Disp: , Rfl:     LANTUS SOLOSTAR U-100 INSULIN glargine 100 units/mL SubQ pen, Inject 6 Units into the skin nightly., Disp: , Rfl:     metFORMIN (GLUCOPHAGE-XR) 500 MG ER 24hr tablet, Take 500 mg " by mouth 2 (two) times daily., Disp: , Rfl:     rosuvastatin (CRESTOR) 10 MG tablet, Take 10 mg by mouth every evening., Disp: , Rfl:     valACYclovir (VALTREX) 500 MG tablet, TAKE 1 TABLET BY MOUTH ONCE DAILY, Disp: 30 tablet, Rfl: 4    dextroamphetamine-amphetamine (ADDERALL) 30 mg Tab, Take 1 tablet (30 mg total) by mouth 2 (two) times a day., Disp: 60 tablet, Rfl: 0    EScitalopram oxalate (LEXAPRO) 20 MG tablet, Take 1 tablet (20 mg total) by mouth once daily. Take with 5mg tablet for total dose of 25mg/day, Disp: 30 tablet, Rfl: 2    EScitalopram oxalate (LEXAPRO) 5 MG Tab, Take 1 tablet (5 mg total) by mouth once daily. Take with 20mg tab for total dose of 25mg/day, Disp: 30 tablet, Rfl: 2    hydrOXYzine HCL (ATARAX) 10 MG Tab, Take 1 tablet (10 mg total) by mouth every evening., Disp: 30 tablet, Rfl: 2       Significant Labs: - none at this time    Significant Imaging: - none at this time    Physical Exam  Vitals and nursing note reviewed.   Constitutional:       General: She is awake.      Appearance: Normal appearance.   Musculoskeletal:      Comments: Full ROM   Neurological:      Mental Status: She is alert.   Psychiatric:         Attention and Perception: Attention and perception normal. She does not perceive auditory or visual hallucinations.         Mood and Affect: Affect normal.         Speech: Speech normal.         Behavior: Behavior is cooperative.         Thought Content: Thought content does not include homicidal or suicidal ideation.         Cognition and Memory: Cognition and memory normal.          Review of Systems     Mental Status Exam:  Presentation:  - Appearance: 39 y.o. year old White female, appears stated age, appears Casually dressed and Well groomed  - Motility: Erect when standing, Steady gait, No EPS or Tremors, No psychomotor agitation or retardation appreciated  - Behavior: calm, cooperative, good eye contact  Speech:  - Character/Organization: spontaneous, fluent, normal  "volume, normal rate, normal rhythm  Emotional State:  - Mood: "happy"   - Affect: congruent and appropriate  Thought:  - Process: logical, linear, organized , goal-directed  - Preoccupations: no ruminations, rituals, or phobias appreciated  - Delusions: no persecutory, paranoid, or grandiose delusions appreciated  - Perception: denies AVH, not actively responding to internal stimuli  - SI/HI: denies/denies  Sensorium & Intellect:  - Sensorium: AAOx4  - Memory: intact to recent and remote events  - Attention/Concentration: good/good  - Insight/Judgement: good/good    Gait: normal swing and stance  MSK:no rigidity appreciated    All other systems without acute issues unless noted in HPI      Assessment/Plan      ICD-10-CM ICD-9-CM    1. Major depressive disorder, recurrent episode, moderate  F33.1 296.32 EScitalopram oxalate (LEXAPRO) 20 MG tablet      EScitalopram oxalate (LEXAPRO) 5 MG Tab      2. Generalized anxiety disorder  F41.1 300.02 hydrOXYzine HCL (ATARAX) 10 MG Tab      3. Attention deficit hyperactivity disorder (ADHD), unspecified ADHD type  F90.9 314.01 dextroamphetamine-amphetamine (ADDERALL) 30 mg Tab      4. Insomnia, unspecified type  G47.00 780.52          Continue current medications without change    Potential side effects and risks vs benefits of current treatment plan reviewed with patient. Applicable black box warnings reviewed. Encouraged patient not to alter dosages or abruptly discontinue medications without contacting prescriber first, due to risk of worsening symptoms and decompensation of mental status. Warned of risks associated with herbal remedies and supplements while taking psychotropic medications and of the need to consult prescriber prior to adding any of these to current regimen. Patient should abstain from abuse of alcohol, prescription medications, and illicit drugs. Reviewed when to contact clinic and/or seek emergent care, such as but not limited to, onset/worsening SI/HI, " hallucinations, delusions, manic symptoms. Pt verbalized understanding and agreement of these warnings/recommendations and verbally consented to treatment plan.     checked. Results as expected. No suspected diversion or abuse of controlled substances.      Follow up in about 3 months (around 4/13/2025) for Medication Management.        Yefri Medrano, LUCHOP

## 2025-01-15 ENCOUNTER — TELEPHONE (OUTPATIENT)
Dept: FAMILY MEDICINE | Facility: CLINIC | Age: 40
End: 2025-01-15
Payer: MEDICAID

## 2025-02-03 ENCOUNTER — OFFICE VISIT (OUTPATIENT)
Dept: FAMILY MEDICINE | Facility: CLINIC | Age: 40
End: 2025-02-03
Payer: MEDICAID

## 2025-02-03 VITALS
WEIGHT: 131.38 LBS | BODY MASS INDEX: 23.28 KG/M2 | SYSTOLIC BLOOD PRESSURE: 102 MMHG | HEIGHT: 63 IN | DIASTOLIC BLOOD PRESSURE: 78 MMHG | OXYGEN SATURATION: 99 % | HEART RATE: 71 BPM | TEMPERATURE: 99 F

## 2025-02-03 DIAGNOSIS — Z79.4 TYPE 2 DIABETES MELLITUS WITHOUT COMPLICATION, WITH LONG-TERM CURRENT USE OF INSULIN: ICD-10-CM

## 2025-02-03 DIAGNOSIS — L40.9 PSORIASIS: ICD-10-CM

## 2025-02-03 DIAGNOSIS — M79.644 FINGER PAIN, RIGHT: Primary | ICD-10-CM

## 2025-02-03 DIAGNOSIS — E11.9 TYPE 2 DIABETES MELLITUS WITHOUT COMPLICATION, WITH LONG-TERM CURRENT USE OF INSULIN: ICD-10-CM

## 2025-02-03 PROCEDURE — 3008F BODY MASS INDEX DOCD: CPT | Mod: CPTII,,, | Performed by: NURSE PRACTITIONER

## 2025-02-03 PROCEDURE — 82043 UR ALBUMIN QUANTITATIVE: CPT | Performed by: NURSE PRACTITIONER

## 2025-02-03 PROCEDURE — 3078F DIAST BP <80 MM HG: CPT | Mod: CPTII,,, | Performed by: NURSE PRACTITIONER

## 2025-02-03 PROCEDURE — 3074F SYST BP LT 130 MM HG: CPT | Mod: CPTII,,, | Performed by: NURSE PRACTITIONER

## 2025-02-03 PROCEDURE — 99213 OFFICE O/P EST LOW 20 MIN: CPT | Mod: ,,, | Performed by: NURSE PRACTITIONER

## 2025-02-03 PROCEDURE — 3061F NEG MICROALBUMINURIA REV: CPT | Mod: CPTII,,, | Performed by: NURSE PRACTITIONER

## 2025-02-03 PROCEDURE — 3066F NEPHROPATHY DOC TX: CPT | Mod: CPTII,,, | Performed by: NURSE PRACTITIONER

## 2025-02-03 NOTE — PROGRESS NOTES
Patient ID: [id number]     Chief Complaint: Finger Pain      Subjective     Nahomi Warren is a 40 y.o. female in the office for Finger Pain    Swelling and pain in the index finger on her right hand.  Making it difficult to work.  She does have a history of psoriasis. Denies injury, possible overuse.         Past Medical History:  has a past medical history of ADHD (attention deficit hyperactivity disorder), Anemia, Anxiety, Depression, Diabetes mellitus, Elevated liver enzymes, Insomnia, Panic attack, and Rheumatoid arthritis, unspecified.    Social History:  reports that she quit smoking about 12 months ago. Her smoking use included cigarettes and vaping with nicotine. She started smoking about 16 years ago. She has a 7.9 pack-year smoking history. She has never used smokeless tobacco. She reports current alcohol use of about 1.0 standard drink of alcohol per week. She reports that she does not use drugs.    Current Outpatient Medications   Medication Instructions    DEXCOM G7 SENSOR Zara 1 each, subcutaneous (via wearable injector), Every 14 days    dextroamphetamine-amphetamine (ADDERALL) 30 mg Tab 30 mg, Oral, 2 times daily    EScitalopram oxalate (LEXAPRO) 20 mg, Oral, Daily, Take with 5mg tablet for total dose of 25mg/day    EScitalopram oxalate (LEXAPRO) 5 mg, Oral, Daily, Take with 20mg tab for total dose of 25mg/day    folic acid (FOLVITE) 1 mg, Oral, Daily    HUMALOG KWIKPEN INSULIN 100 unit/mL pen Inject into the skin. Per sliding scale    hydrOXYzine HCL (ATARAX) 10 mg, Oral, Nightly    JARDIANCE 10 mg, Oral    LANTUS SOLOSTAR U-100 INSULIN 6 Units, Subcutaneous, Nightly    metFORMIN (GLUCOPHAGE-XR) 500 mg, Oral, 2 times daily    methotrexate 2.5 MG Tab Take 4 tablets (10 mg total) by mouth once a week for 2 weeks. Then, take 6 tablets (15 mg total) by mouth once a week for 2 weeks. Please hold for fever or infections.    nabumetone (RELAFEN) 750 mg, Oral, 2 times daily    pen needle, diabetic 31 gauge x  "1/4" Ndle 2 each, Misc.(Non-Drug; Combo Route), Daily    rosuvastatin (CRESTOR) 10 mg, Oral, Nightly    valACYclovir (VALTREX) 500 mg, Oral       Patient is allergic to sulfa (sulfonamide antibiotics).     Patient Care Team:  Carrie Hawkins FNP-C as PCP - General (Family Medicine)  Miguel Zavala MD as Consulting Physician (Endocrinology)  Hossein García MD as Consulting Physician (Obstetrics and Gynecology)  [facility], [facility]     Objective     Visit Vitals  /78 (BP Location: Right arm, Patient Position: Sitting)   Pulse 71   Temp 98.5 °F (36.9 °C) (Oral)   Ht 5' 2.99" (1.6 m)   Wt 59.6 kg (131 lb 6.4 oz)   LMP 01/19/2025 (Exact Date)   SpO2 99%   BMI 23.28 kg/m²       Physical Exam  Vitals reviewed.   Constitutional:       General: She is not in acute distress.  Cardiovascular:      Rate and Rhythm: Normal rate and regular rhythm.      Heart sounds: Normal heart sounds.   Pulmonary:      Effort: Pulmonary effort is normal.      Breath sounds: Normal breath sounds.   Musculoskeletal:      Right hand: Swelling (right second PIP) present. Normal range of motion.      Skin:     General: Skin is warm and dry.   Neurological:      Mental Status: She is alert and oriented to person, place, and time.   Psychiatric:         Mood and Affect: Mood normal.     Assessment & Plan     1. Finger pain, right  Comments:  Dactylitis    2. Psoriasis    3. Type 2 diabetes mellitus without complication, with long-term current use of insulin  Overview:  On Lantus, humalog, metformin,   Off Mounjaro, was losing too much weight.     Orders:  -     Microalbumin/Creatinine Ratio, Urine    Conservative treatment with RICE. RTC SWOP.     Follow up in about 1 month (around 3/3/2025). In addition to their next scheduled appointment, the patient has also been instructed to follow up on as needed basis.     Future Appointments   Date Time Provider Department Center   6/23/2025 10:00 AM Carrie Hawkins FNP-C [facility]   7/9/2025  9:00 AM UL XR1 UL XRAY [facility]   9/8/2025  9:00 AM Martha Norwood, SHELBIP UL RHEU [facility]   9/15/2025  9:00 AM Yefri Medrano, PMHNP [facility]   11/10/2025  9:00 AM Ariana Melton, WHNP [facility]        This note was generated with the assistance of ambient listening technology. Verbal consent was obtained by the patient and accompanying visitor(s) for the recording of patient appointment to facilitate this note. I attest to having reviewed and edited the generated note for accuracy, though some syntax or spelling errors may persist. Please contact the author of this note for any clarification.

## 2025-02-04 LAB
CREAT UR-MCNC: 57.8 MG/DL (ref 45–106)
MICROALBUMIN UR-MCNC: <5 UG/ML
MICROALBUMIN/CREAT RATIO PNL UR: NORMAL

## 2025-02-05 ENCOUNTER — PATIENT OUTREACH (OUTPATIENT)
Facility: CLINIC | Age: 40
End: 2025-02-05
Payer: MEDICAID

## 2025-02-05 NOTE — PROGRESS NOTES
Health Maintenance Topic(s) Outreach Outcomes & Actions Taken:    Eye Exam - Outreach Outcomes & Actions Taken  : Diabetic Eye External Records Uploaded, Care Team & History Updated if Applicable       Additional Notes:  Diabetic Eye Exam 6/6/24

## 2025-02-14 ENCOUNTER — OFFICE VISIT (OUTPATIENT)
Dept: FAMILY MEDICINE | Facility: CLINIC | Age: 40
End: 2025-02-14
Payer: MEDICAID

## 2025-02-14 VITALS
WEIGHT: 133.63 LBS | TEMPERATURE: 98 F | SYSTOLIC BLOOD PRESSURE: 130 MMHG | OXYGEN SATURATION: 99 % | BODY MASS INDEX: 23.68 KG/M2 | DIASTOLIC BLOOD PRESSURE: 82 MMHG | HEART RATE: 74 BPM | HEIGHT: 63 IN

## 2025-02-14 DIAGNOSIS — M79.89 SWELLING OF FINGER: Primary | ICD-10-CM

## 2025-02-14 NOTE — ASSESSMENT & PLAN NOTE
Continue daily meloxicam.  After 1 month, if no improvement notify the office and will be changed to indomethacin.

## 2025-02-14 NOTE — PROGRESS NOTES
Patient ID: [id number]     Chief Complaint: Hand Pain (1 week follow up )      HPI:     Nahomi Warren is a 39 y.o. female in the office for Hand Pain (1 week follow up )  No change in the pain or swelling in her right index finger.  After reviewing case with Dr. Morgan he agrees that no MRI is necessary in that she should keep her appointment with rheumatology for suspected psoriatic arthritis.      Past Medical History:  has a past medical history of ADHD (attention deficit hyperactivity disorder), Anemia, Anxiety, Depression, Diabetes mellitus, Elevated liver enzymes, Insomnia, and Panic attack.    Social History:  reports that she quit smoking about 8 months ago. Her smoking use included cigarettes and vaping with nicotine. She started smoking about 16 years ago. She has a 7.9 pack-year smoking history. She has never used smokeless tobacco. She reports current alcohol use. She reports that she does not use drugs.    Current Outpatient Medications   Medication Instructions    DEXCOM G7 SENSOR Zara 1 each, Every 14 days    dextroamphetamine-amphetamine (ADDERALL) 30 mg Tab 30 mg, Oral, 2 times daily    EScitalopram oxalate (LEXAPRO) 20 mg, Oral, Daily, Take with 5mg tablet for total dose of 25mg/day    EScitalopram oxalate (LEXAPRO) 5 mg, Oral, Daily, Take with 20mg tab for total dose of 25mg/day    HUMALOG KWIKPEN INSULIN 100 unit/mL pen Inject into the skin.    hydrOXYzine HCL (ATARAX) 10 mg, Oral, Nightly    LANTUS SOLOSTAR U-100 INSULIN 6 Units, Nightly    metFORMIN (GLUCOPHAGE-XR) 500 mg, 2 times daily    rosuvastatin (CRESTOR) 10 mg, Nightly    valACYclovir (VALTREX) 500 mg, Oral       Patient is allergic to sulfa (sulfonamide antibiotics).     Patient Care Team:  Carrie Hawkins FNP-C as PCP - General (Family Medicine)  Miguel Zavala MD as Consulting Physician (Endocrinology)  Hossein García MD as Consulting Physician (Obstetrics and Gynecology)  [facility]     Subjective     Review of  "Systems    See HPI     Objective     Visit Vitals  /82 (BP Location: Left arm, Patient Position: Sitting)   Pulse 74   Temp 97.9 °F (36.6 °C) (Temporal)   Ht 5' 2.99" (1.6 m)   Wt 60.6 kg (133 lb 9.6 oz)   LMP 02/14/2025 (Exact Date)   SpO2 99%   BMI 23.67 kg/m²       Physical Exam  Vitals reviewed.   Constitutional:       General: She is not in acute distress.  Cardiovascular:      Rate and Rhythm: Normal rate and regular rhythm.      Heart sounds: Normal heart sounds.   Pulmonary:      Effort: Pulmonary effort is normal.      Breath sounds: Normal breath sounds.   Musculoskeletal:      Right hand: Swelling (right second PIP) present. Normal range of motion.      Comments: See photo   Skin:     General: Skin is warm and dry.   Neurological:      Mental Status: She is alert and oriented to person, place, and time.   Psychiatric:         Mood and Affect: Mood normal.         Assessment & Plan:     1. Swelling of finger  Overview:  Dactylitis      Assessment & Plan:  Continue daily meloxicam.  After 1 month, if no improvement notify the office and will be changed to indomethacin.       Follow up if symptoms worsen or fail to improve, for Keep scheduled appointment. In addition to their next scheduled appointment, the patient has also been instructed to follow up on as needed basis.     Future Appointments   Date Time Provider Department Center   3/3/2025 10:20 AM Yefri Medrano PMHNP [facility]   6/2/2025  8:00 AM Martha Bell FNP [facility] [place]    6/16/2025  8:50 AM LAB, [facility] LABORATORY DRAW STATION [facility] NOEMI Braden [facility]   6/23/2025 10:00 AM Carrie Hawkins FNP-C [facility] MATTHEW [facility]   11/10/2025  9:00 AM Ariana Melton WHNP [facility]      "

## 2025-02-14 NOTE — PROGRESS NOTES
"Patient ID: [id number]     Chief Complaint: Hand Pain (1 week follow up )      HPI:     Nahomi Warren is a 39 y.o. female in the office for Hand Pain (1 week follow up )      Past Medical History:  has a past medical history of ADHD (attention deficit hyperactivity disorder), Anemia, Anxiety, Depression, Diabetes mellitus, Elevated liver enzymes, Insomnia, and Panic attack.    Social History:  reports that she quit smoking about 8 months ago. Her smoking use included cigarettes and vaping with nicotine. She started smoking about 16 years ago. She has a 7.9 pack-year smoking history. She has never used smokeless tobacco. She reports current alcohol use. She reports that she does not use drugs.    Current Outpatient Medications   Medication Instructions    DEXCOM G7 SENSOR Zara 1 each, Every 14 days    dextroamphetamine-amphetamine (ADDERALL) 30 mg Tab 30 mg, Oral, 2 times daily    EScitalopram oxalate (LEXAPRO) 20 mg, Oral, Daily, Take with 5mg tablet for total dose of 25mg/day    EScitalopram oxalate (LEXAPRO) 5 mg, Oral, Daily, Take with 20mg tab for total dose of 25mg/day    HUMALOG KWIKPEN INSULIN 100 unit/mL pen Inject into the skin.    hydrOXYzine HCL (ATARAX) 10 mg, Oral, Nightly    LANTUS SOLOSTAR U-100 INSULIN 6 Units, Nightly    metFORMIN (GLUCOPHAGE-XR) 500 mg, 2 times daily    rosuvastatin (CRESTOR) 10 mg, Nightly    valACYclovir (VALTREX) 500 mg, Oral       Patient is allergic to sulfa (sulfonamide antibiotics).     Patient Care Team:  Carrie Hawkins FNP-C as PCP - General (Family Medicine)  Miguel Zavala MD as Consulting Physician (Endocrinology)  Hossein García MD as Consulting Physician (Obstetrics and Gynecology)  [facility]     Subjective     Review of Systems    See HPI     Objective     Visit Vitals  /82 (BP Location: Left arm, Patient Position: Sitting)   Pulse 74   Temp 97.9 °F (36.6 °C) (Temporal)   Ht 5' 2.99" (1.6 m)   Wt 60.6 kg (133 lb 9.6 oz)   LMP 02/14/2025 " (Exact Date)   SpO2 99%   BMI 23.67 kg/m²       Physical Exam    Assessment & Plan:     {There are no diagnoses linked to this encounter. (Refresh or delete this SmartLink)}     No follow-ups on file. In addition to their next scheduled appointment, the patient has also been instructed to follow up on as needed basis.     Future Appointments   Date Time Provider Department Center   3/3/2025 10:20 AM Yefri Medrano PMHNP [facility]   6/2/2025  8:00 AM Martha Bell FNP [facility] [place]    6/16/2025  8:50 AM LAB, [facility] LABORATORY DRAW STATION [facility] NOEMI Braden [facility]   6/23/2025 10:00 AM Carrie Hawkins FNP-C [facility]   11/10/2025  9:00 AM Ariana Melton WHNP [facility]

## 2025-02-26 ENCOUNTER — TELEPHONE (OUTPATIENT)
Dept: FAMILY MEDICINE | Facility: CLINIC | Age: 40
End: 2025-02-26
Payer: MEDICAID

## 2025-02-26 DIAGNOSIS — F90.9 ATTENTION DEFICIT HYPERACTIVITY DISORDER (ADHD), UNSPECIFIED ADHD TYPE: ICD-10-CM

## 2025-02-26 RX ORDER — DEXTROAMPHETAMINE SACCHARATE, AMPHETAMINE ASPARTATE, DEXTROAMPHETAMINE SULFATE AND AMPHETAMINE SULFATE 7.5; 7.5; 7.5; 7.5 MG/1; MG/1; MG/1; MG/1
30 TABLET ORAL 2 TIMES DAILY
Qty: 60 TABLET | Refills: 0 | Status: SHIPPED | OUTPATIENT
Start: 2025-02-26 | End: 2025-03-28

## 2025-03-03 ENCOUNTER — OFFICE VISIT (OUTPATIENT)
Dept: BEHAVIORAL HEALTH | Facility: CLINIC | Age: 40
End: 2025-03-03
Payer: MEDICAID

## 2025-03-03 VITALS
OXYGEN SATURATION: 100 % | HEART RATE: 76 BPM | SYSTOLIC BLOOD PRESSURE: 122 MMHG | HEIGHT: 63 IN | WEIGHT: 135.56 LBS | DIASTOLIC BLOOD PRESSURE: 74 MMHG | TEMPERATURE: 98 F | BODY MASS INDEX: 24.02 KG/M2

## 2025-03-03 DIAGNOSIS — F33.1 MAJOR DEPRESSIVE DISORDER, RECURRENT EPISODE, MODERATE: Primary | ICD-10-CM

## 2025-03-03 DIAGNOSIS — G47.00 INSOMNIA, UNSPECIFIED TYPE: ICD-10-CM

## 2025-03-03 DIAGNOSIS — F90.9 ATTENTION DEFICIT HYPERACTIVITY DISORDER (ADHD), UNSPECIFIED ADHD TYPE: ICD-10-CM

## 2025-03-03 DIAGNOSIS — F41.1 GENERALIZED ANXIETY DISORDER: ICD-10-CM

## 2025-03-03 RX ORDER — DEXTROAMPHETAMINE SACCHARATE, AMPHETAMINE ASPARTATE, DEXTROAMPHETAMINE SULFATE AND AMPHETAMINE SULFATE 7.5; 7.5; 7.5; 7.5 MG/1; MG/1; MG/1; MG/1
30 TABLET ORAL 2 TIMES DAILY
Qty: 60 TABLET | Refills: 0 | Status: SHIPPED | OUTPATIENT
Start: 2025-03-03 | End: 2025-04-02

## 2025-03-03 RX ORDER — ESCITALOPRAM OXALATE 5 MG/1
5 TABLET ORAL DAILY
Qty: 30 TABLET | Refills: 2 | Status: SHIPPED | OUTPATIENT
Start: 2025-03-03

## 2025-03-03 RX ORDER — ESCITALOPRAM OXALATE 20 MG/1
20 TABLET ORAL DAILY
Qty: 30 TABLET | Refills: 2 | Status: SHIPPED | OUTPATIENT
Start: 2025-03-03

## 2025-03-03 RX ORDER — MELOXICAM 15 MG/1
15 TABLET ORAL DAILY
COMMUNITY

## 2025-03-03 RX ORDER — DEXTROAMPHETAMINE SACCHARATE, AMPHETAMINE ASPARTATE, DEXTROAMPHETAMINE SULFATE AND AMPHETAMINE SULFATE 7.5; 7.5; 7.5; 7.5 MG/1; MG/1; MG/1; MG/1
30 TABLET ORAL 2 TIMES DAILY
Qty: 60 TABLET | Refills: 0 | Status: SHIPPED | OUTPATIENT
Start: 2025-05-02 | End: 2025-06-01

## 2025-03-03 RX ORDER — DEXTROAMPHETAMINE SACCHARATE, AMPHETAMINE ASPARTATE, DEXTROAMPHETAMINE SULFATE AND AMPHETAMINE SULFATE 7.5; 7.5; 7.5; 7.5 MG/1; MG/1; MG/1; MG/1
30 TABLET ORAL 2 TIMES DAILY
Qty: 60 TABLET | Refills: 0 | Status: SHIPPED | OUTPATIENT
Start: 2025-04-02 | End: 2025-05-02

## 2025-03-03 RX ORDER — HYDROXYZINE HYDROCHLORIDE 10 MG/1
10 TABLET, FILM COATED ORAL NIGHTLY
Qty: 30 TABLET | Refills: 2 | Status: SHIPPED | OUTPATIENT
Start: 2025-03-03

## 2025-03-03 NOTE — PROGRESS NOTES
"PSYCHIATRIC FOLLOW-UP VISIT NOTE    Chief Complaint   Patient presents with    Medication Management     3 month medication management         History of Present Illness  39 y.o. year old White female with hx of MDD, nomi, ADHD, and insomnia seen today for follow-up appointment and medication management.  Patient reports that she has been doing excellent on current medication regimen.  Denies any recent depression or anxiety.  At our last visit her children were having some issues at home, but these have resolved.  Her oldest has been sleepwalking again recently but they are working on strategies to manage this.  Patient has been sleeping well.  No issues with focus or concentration.  Attending to responsibilities at work and at home without significant difficulty.  She denies any side effects from current medication regimen. Patient denies SI/HI. Denies hallucinations and does not appear to be responding to internal stimuli or be internally preoccupied. No manic symptoms noted.       Objective:     Vitals:  Vitals:    03/03/25 1019   BP: 122/74   BP Location: Right arm   Patient Position: Sitting   Pulse: 76   Temp: 97.7 °F (36.5 °C)   TempSrc: Temporal   SpO2: 100%   Weight: 61.5 kg (135 lb 9.3 oz)   Height: 5' 2.99" (1.6 m)       Wt Readings from Last 3 Encounters:   03/03/25 1019 61.5 kg (135 lb 9.3 oz)   02/14/25 1507 60.6 kg (133 lb 9.6 oz)   02/03/25 1532 59.6 kg (131 lb 6.4 oz)         Medication:  Current Medications[1]       Significant Labs: - none at this time    Significant Imaging: - none at this time    Physical Exam  Vitals and nursing note reviewed.   Constitutional:       General: She is awake.      Appearance: Normal appearance.   Musculoskeletal:      Comments: Full ROM   Neurological:      Mental Status: She is alert.   Psychiatric:         Attention and Perception: Attention and perception normal. She does not perceive auditory or visual hallucinations.         Mood and Affect: Affect normal.    " "     Speech: Speech normal.         Behavior: Behavior is cooperative.         Thought Content: Thought content does not include homicidal or suicidal ideation.         Cognition and Memory: Cognition and memory normal.          Review of Systems     Mental Status Exam:  Presentation:  - Appearance: 39 y.o. year old White female, appears stated age, appears Casually dressed and Well groomed  - Motility: Erect when standing, Steady gait, No EPS or Tremors, No psychomotor agitation or retardation appreciated  - Behavior: calm, cooperative, good eye contact  Speech:  - Character/Organization: spontaneous, fluent, normal volume, normal rate, normal rhythm  Emotional State:  - Mood: "happy"   - Affect: congruent and appropriate  Thought:  - Process: logical, linear, organized , goal-directed  - Preoccupations: no ruminations, rituals, or phobias appreciated  - Delusions: no persecutory, paranoid, or grandiose delusions appreciated  - Perception: denies AVH, not actively responding to internal stimuli  - SI/HI: denies/denies  Sensorium & Intellect:  - Sensorium: AAOx4  - Memory: intact to recent and remote events  - Attention/Concentration: good/good  - Insight/Judgement: good/good    Gait: normal swing and stance  MSK:no rigidity appreciated    All other systems without acute issues unless noted in HPI      Assessment/Plan      ICD-10-CM ICD-9-CM    1. Major depressive disorder, recurrent episode, moderate  F33.1 296.32 EScitalopram oxalate (LEXAPRO) 20 MG tablet      EScitalopram oxalate (LEXAPRO) 5 MG Tab      2. Generalized anxiety disorder  F41.1 300.02 hydrOXYzine HCL (ATARAX) 10 MG Tab      3. Attention deficit hyperactivity disorder (ADHD), unspecified ADHD type  F90.9 314.01 dextroamphetamine-amphetamine (ADDERALL) 30 mg Tab      dextroamphetamine-amphetamine (ADDERALL) 30 mg Tab      dextroamphetamine-amphetamine (ADDERALL) 30 mg Tab      4. Insomnia, unspecified type  G47.00 780.52          Continue current " medications without change    Potential side effects and risks vs benefits of current treatment plan reviewed with patient. Applicable black box warnings reviewed. Encouraged patient not to alter dosages or abruptly discontinue medications without contacting prescriber first, due to risk of worsening symptoms and decompensation of mental status. Warned of risks associated with herbal remedies and supplements while taking psychotropic medications and of the need to consult prescriber prior to adding any of these to current regimen. Patient should abstain from abuse of alcohol, prescription medications, and illicit drugs. Reviewed when to contact clinic and/or seek emergent care, such as but not limited to, onset/worsening SI/HI, hallucinations, delusions, manic symptoms. Pt verbalized understanding and agreement of these warnings/recommendations and verbally consented to treatment plan.     checked. Results as expected. No suspected diversion or abuse of controlled substances.      Follow up in about 3 months (around 6/3/2025) for Medication Management.        Yefri Medrano, LUCHOP         [1]   Current Outpatient Medications:     DEXCOM G7 SENSOR Zara, 1 each by subcutaneous (via wearable injector) route every 14 (fourteen) days., Disp: , Rfl:     HUMALOG KWIKPEN INSULIN 100 unit/mL pen, Inject into the skin. Per sliding scale, Disp: , Rfl:     LANTUS SOLOSTAR U-100 INSULIN glargine 100 units/mL SubQ pen, Inject 6 Units into the skin nightly., Disp: , Rfl:     meloxicam (MOBIC) 15 MG tablet, Take 15 mg by mouth once daily., Disp: , Rfl:     metFORMIN (GLUCOPHAGE-XR) 500 MG ER 24hr tablet, Take 500 mg by mouth 2 (two) times daily., Disp: , Rfl:     rosuvastatin (CRESTOR) 10 MG tablet, Take 10 mg by mouth every evening., Disp: , Rfl:     valACYclovir (VALTREX) 500 MG tablet, TAKE 1 TABLET BY MOUTH ONCE DAILY, Disp: 30 tablet, Rfl: 4    dextroamphetamine-amphetamine (ADDERALL) 30 mg Tab, Take 1 tablet (30 mg  total) by mouth 2 (two) times a day., Disp: 60 tablet, Rfl: 0    [START ON 4/2/2025] dextroamphetamine-amphetamine (ADDERALL) 30 mg Tab, Take 1 tablet (30 mg total) by mouth 2 (two) times a day., Disp: 60 tablet, Rfl: 0    [START ON 5/2/2025] dextroamphetamine-amphetamine (ADDERALL) 30 mg Tab, Take 1 tablet (30 mg total) by mouth 2 (two) times a day., Disp: 60 tablet, Rfl: 0    EScitalopram oxalate (LEXAPRO) 20 MG tablet, Take 1 tablet (20 mg total) by mouth once daily. Take with 5mg tablet for total dose of 25mg/day, Disp: 30 tablet, Rfl: 2    EScitalopram oxalate (LEXAPRO) 5 MG Tab, Take 1 tablet (5 mg total) by mouth once daily. Take with 20mg tab for total dose of 25mg/day, Disp: 30 tablet, Rfl: 2    hydrOXYzine HCL (ATARAX) 10 MG Tab, Take 1 tablet (10 mg total) by mouth every evening., Disp: 30 tablet, Rfl: 2

## 2025-03-10 ENCOUNTER — TELEPHONE (OUTPATIENT)
Dept: FAMILY MEDICINE | Facility: CLINIC | Age: 40
End: 2025-03-10
Payer: MEDICAID

## 2025-03-10 DIAGNOSIS — Z79.4 TYPE 2 DIABETES MELLITUS WITHOUT COMPLICATION, WITH LONG-TERM CURRENT USE OF INSULIN: Primary | ICD-10-CM

## 2025-03-10 DIAGNOSIS — E11.9 TYPE 2 DIABETES MELLITUS WITHOUT COMPLICATION, WITH LONG-TERM CURRENT USE OF INSULIN: Primary | ICD-10-CM

## 2025-03-10 RX ORDER — INSULIN GLARGINE 100 [IU]/ML
6 INJECTION, SOLUTION SUBCUTANEOUS NIGHTLY
Qty: 3 ML | Refills: 11 | Status: SHIPPED | OUTPATIENT
Start: 2025-03-10

## 2025-03-10 RX ORDER — METFORMIN HYDROCHLORIDE 500 MG/1
500 TABLET, EXTENDED RELEASE ORAL 2 TIMES DAILY
Qty: 180 TABLET | Refills: 11 | Status: SHIPPED | OUTPATIENT
Start: 2025-03-10

## 2025-03-10 RX ORDER — BLOOD-GLUCOSE SENSOR
1 EACH MISCELLANEOUS
Qty: 2 EACH | Refills: 11 | Status: SHIPPED | OUTPATIENT
Start: 2025-03-10

## 2025-03-24 ENCOUNTER — OFFICE VISIT (OUTPATIENT)
Dept: FAMILY MEDICINE | Facility: CLINIC | Age: 40
End: 2025-03-24
Payer: MEDICAID

## 2025-03-24 ENCOUNTER — TELEPHONE (OUTPATIENT)
Dept: FAMILY MEDICINE | Facility: CLINIC | Age: 40
End: 2025-03-24

## 2025-03-24 VITALS
HEIGHT: 63 IN | TEMPERATURE: 98 F | SYSTOLIC BLOOD PRESSURE: 128 MMHG | HEART RATE: 84 BPM | OXYGEN SATURATION: 98 % | DIASTOLIC BLOOD PRESSURE: 80 MMHG | WEIGHT: 131.63 LBS | BODY MASS INDEX: 23.32 KG/M2

## 2025-03-24 DIAGNOSIS — Z79.4 TYPE 2 DIABETES MELLITUS WITHOUT COMPLICATION, WITH LONG-TERM CURRENT USE OF INSULIN: ICD-10-CM

## 2025-03-24 DIAGNOSIS — M25.531 RIGHT WRIST PAIN: ICD-10-CM

## 2025-03-24 DIAGNOSIS — E11.9 TYPE 2 DIABETES MELLITUS WITHOUT COMPLICATION, WITH LONG-TERM CURRENT USE OF INSULIN: ICD-10-CM

## 2025-03-24 DIAGNOSIS — M65.4 DE QUERVAIN'S TENOSYNOVITIS, LEFT: Primary | ICD-10-CM

## 2025-03-24 PROCEDURE — 3074F SYST BP LT 130 MM HG: CPT | Mod: CPTII,,, | Performed by: NURSE PRACTITIONER

## 2025-03-24 PROCEDURE — 1159F MED LIST DOCD IN RCRD: CPT | Mod: CPTII,,, | Performed by: NURSE PRACTITIONER

## 2025-03-24 PROCEDURE — 3066F NEPHROPATHY DOC TX: CPT | Mod: CPTII,,, | Performed by: NURSE PRACTITIONER

## 2025-03-24 PROCEDURE — 1160F RVW MEDS BY RX/DR IN RCRD: CPT | Mod: CPTII,,, | Performed by: NURSE PRACTITIONER

## 2025-03-24 PROCEDURE — 99214 OFFICE O/P EST MOD 30 MIN: CPT | Mod: ,,, | Performed by: NURSE PRACTITIONER

## 2025-03-24 PROCEDURE — 3061F NEG MICROALBUMINURIA REV: CPT | Mod: CPTII,,, | Performed by: NURSE PRACTITIONER

## 2025-03-24 PROCEDURE — 3008F BODY MASS INDEX DOCD: CPT | Mod: CPTII,,, | Performed by: NURSE PRACTITIONER

## 2025-03-24 PROCEDURE — 3079F DIAST BP 80-89 MM HG: CPT | Mod: CPTII,,, | Performed by: NURSE PRACTITIONER

## 2025-03-24 PROCEDURE — G2211 COMPLEX E/M VISIT ADD ON: HCPCS | Mod: ,,, | Performed by: NURSE PRACTITIONER

## 2025-03-24 RX ORDER — NABUMETONE 750 MG/1
750 TABLET, FILM COATED ORAL 2 TIMES DAILY
Qty: 60 TABLET | Refills: 3 | Status: SHIPPED | OUTPATIENT
Start: 2025-03-24

## 2025-03-24 RX ORDER — PIOGLITAZONEHYDROCHLORIDE 15 MG/1
15 TABLET ORAL DAILY
Qty: 90 TABLET | Refills: 3 | Status: SHIPPED | OUTPATIENT
Start: 2025-03-24

## 2025-03-24 NOTE — PROGRESS NOTES
Patient ID: [id number]     Chief Complaint: Wrist Pain      HPI:     Nahomi Warren is a 39 y.o. female in the office for Wrist Pain      HPI    Past Medical History:  has a past medical history of ADHD (attention deficit hyperactivity disorder), Anemia, Anxiety, Depression, Diabetes mellitus, Elevated liver enzymes, Insomnia, and Panic attack.    Social History:  reports that she quit smoking about 9 months ago. Her smoking use included cigarettes and vaping with nicotine. She started smoking about 16 years ago. She has a 7.9 pack-year smoking history. She has never used smokeless tobacco. She reports current alcohol use. She reports that she does not use drugs.    Current Outpatient Medications   Medication Instructions    DEXCOM G7 SENSOR Zara 1 each, subcutaneous (via wearable injector), Every 14 days    dextroamphetamine-amphetamine (ADDERALL) 30 mg Tab 30 mg, Oral, 2 times daily    [START ON 4/2/2025] dextroamphetamine-amphetamine (ADDERALL) 30 mg Tab 30 mg, Oral, 2 times daily    [START ON 5/2/2025] dextroamphetamine-amphetamine (ADDERALL) 30 mg Tab 30 mg, Oral, 2 times daily    EScitalopram oxalate (LEXAPRO) 20 mg, Oral, Daily, Take with 5mg tablet for total dose of 25mg/day    EScitalopram oxalate (LEXAPRO) 5 mg, Oral, Daily, Take with 20mg tab for total dose of 25mg/day    HUMALOG KWIKPEN INSULIN 100 unit/mL pen Inject into the skin. Per sliding scale    hydrOXYzine HCL (ATARAX) 10 mg, Oral, Nightly    LANTUS SOLOSTAR U-100 INSULIN 6 Units, Subcutaneous, Nightly    metFORMIN (GLUCOPHAGE-XR) 500 mg, Oral, 2 times daily    nabumetone (RELAFEN) 750 mg, Oral, 2 times daily    pioglitazone (ACTOS) 15 mg, Oral, Daily    rosuvastatin (CRESTOR) 10 mg, Nightly    valACYclovir (VALTREX) 500 mg, Oral       Patient is allergic to sulfa (sulfonamide antibiotics).     Patient Care Team:  Carrie Hawkins FNP-C as PCP - General (Family Medicine)  Miguel Zavala MD as Consulting Physician (Endocrinology)  Hossein García MD as  "Consulting Physician (Obstetrics and Gynecology)  [facility], [facility]     Subjective     Review of Systems    See HPI     Objective     Visit Vitals  /80 (BP Location: Right arm, Patient Position: Sitting)   Pulse 84   Temp 97.7 °F (36.5 °C) (Temporal)   Ht 5' 2.99" (1.6 m)   Wt 59.7 kg (131 lb 9.6 oz)   LMP 03/17/2025 (Exact Date)   SpO2 98%   BMI 23.32 kg/m²       Physical Exam    Assessment & Plan:     1. Type 2 diabetes mellitus without complication, with long-term current use of insulin  -     pioglitazone (ACTOS) 15 MG tablet; Take 1 tablet (15 mg total) by mouth once daily.  Dispense: 90 tablet; Refill: 3    Other orders  -     nabumetone (RELAFEN) 750 MG tablet; Take 1 tablet (750 mg total) by mouth 2 (two) times daily.  Dispense: 60 tablet; Refill: 3         Follow up for (already scheduled).. In addition to their next scheduled appointment, the patient has also been instructed to follow up on as needed basis.     Future Appointments   Date Time Provider Department Center   4/21/2025  9:45 AM OA [facility]1 [facility]   6/2/2025  8:00 AM Martha Bell FNP [facility] [place] [facility]   6/9/2025  9:00 AM Yefri Medrano PMHNP [facility]   6/16/2025  8:50 AM LAB, [facility] LABORATORY DRAW STATION [facility] NOEMI Braden [facility]   6/23/2025 10:00 AM Carrie Hawkins FNP-C [facility]   11/10/2025  9:00 AM Ariana Melton, YOSHI [facility]      "

## 2025-03-24 NOTE — PROGRESS NOTES
Patient ID: [id number]     Chief Complaint: Wrist Pain      HPI:     Nahomi Warren is a 39 y.o. female in the office for Wrist Pain  In left wrist has been present for nearly 2 weeks.  She denies any injury.  She does work in a cafeteria at school.  She believes that the pain could be from overuse by playing on her phone.  She has been taking anti-inflammatories for inflammation in her right index finger.  Most recently completed a of meloxicam.  She did not notice that this helped with her pain/inflammation at all.  She has also tried diclofenac in the past which she states does not help.    Past Medical History:  has a past medical history of ADHD (attention deficit hyperactivity disorder), Anemia, Anxiety, Depression, Diabetes mellitus, Elevated liver enzymes, Insomnia, and Panic attack.    Social History:  reports that she quit smoking about 9 months ago. Her smoking use included cigarettes and vaping with nicotine. She started smoking about 16 years ago. She has a 7.9 pack-year smoking history. She has never used smokeless tobacco. She reports current alcohol use. She reports that she does not use drugs.    Current Outpatient Medications   Medication Instructions    DEXCOM G7 SENSOR Zara 1 each, subcutaneous (via wearable injector), Every 14 days    dextroamphetamine-amphetamine (ADDERALL) 30 mg Tab 30 mg, Oral, 2 times daily    [START ON 4/2/2025] dextroamphetamine-amphetamine (ADDERALL) 30 mg Tab 30 mg, Oral, 2 times daily    [START ON 5/2/2025] dextroamphetamine-amphetamine (ADDERALL) 30 mg Tab 30 mg, Oral, 2 times daily    EScitalopram oxalate (LEXAPRO) 20 mg, Oral, Daily, Take with 5mg tablet for total dose of 25mg/day    EScitalopram oxalate (LEXAPRO) 5 mg, Oral, Daily, Take with 20mg tab for total dose of 25mg/day    HUMALOG KWIKPEN INSULIN 100 unit/mL pen Inject into the skin. Per sliding scale    hydrOXYzine HCL (ATARAX) 10 mg, Oral, Nightly    LANTUS SOLOSTAR U-100 INSULIN 6 Units, Subcutaneous,  "Nightly    metFORMIN (GLUCOPHAGE-XR) 500 mg, Oral, 2 times daily    nabumetone (RELAFEN) 750 mg, Oral, 2 times daily    pioglitazone (ACTOS) 15 mg, Oral, Daily    rosuvastatin (CRESTOR) 10 mg, Nightly    valACYclovir (VALTREX) 500 mg, Oral       Patient is allergic to sulfa (sulfonamide antibiotics).     Patient Care Team:  Carrie Hawkins FNP-C as PCP - General (Family Medicine)  Miguel Zavala MD as Consulting Physician (Endocrinology)  Hossein García MD as Consulting Physician (Obstetrics and Gynecology)  [facility]     Subjective     Review of Systems    See HPI     Objective     Visit Vitals  /80 (BP Location: Right arm, Patient Position: Sitting)   Pulse 84   Temp 97.7 °F (36.5 °C) (Temporal)   Ht 5' 2.99" (1.6 m)   Wt 59.7 kg (131 lb 9.6 oz)   LMP 03/17/2025 (Exact Date)   SpO2 98%   BMI 23.32 kg/m²       Physical Exam  Vitals reviewed.   Constitutional:       General: She is not in acute distress.     Appearance: She is not ill-appearing.   HENT:      Head: Normocephalic.   Cardiovascular:      Rate and Rhythm: Normal rate and regular rhythm.   Pulmonary:      Effort: Pulmonary effort is normal.      Breath sounds: Normal breath sounds.   Musculoskeletal:         General: Normal range of motion.      Right wrist: Tenderness present. No swelling, deformity or snuff box tenderness. Normal range of motion.      Cervical back: Normal range of motion and neck supple.   Skin:     General: Skin is warm and dry.   Neurological:      Mental Status: She is alert and oriented to person, place, and time. Mental status is at baseline.   Psychiatric:         Mood and Affect: Mood normal.         Behavior: Behavior normal.         Assessment & Plan:     1. De Quervain's tenosynovitis, left  Overview:  + Finkelstein      Assessment & Plan:  Start nabumetone, stop all other NSAIDs.  Handout provided, encouraged continued use of forearm-based thumb spica splint, ice.     Orders:  -     nabumetone (RELAFEN) " 750 MG tablet; Take 1 tablet (750 mg total) by mouth 2 (two) times daily.  Dispense: 60 tablet; Refill: 3    2. Type 2 diabetes mellitus without complication, with long-term current use of insulin  Overview:  On Lantus, humalog, metformin,   Off Mounjaro, was losing too much weight.     Assessment & Plan:  Fasting glucose elevated since stopping Mounjaro.    Encouraged DM diet.    Start actos, repeat labs in 3 months.      Orders:  -     pioglitazone (ACTOS) 15 MG tablet; Take 1 tablet (15 mg total) by mouth once daily.  Dispense: 90 tablet; Refill: 3    3. Right wrist pain         Follow up for (already scheduled).. In addition to their next scheduled appointment, the patient has also been instructed to follow up on as needed basis.     Future Appointments   Date Time Provider Department Center   4/21/2025  9:45 AM OA [facility]AM[facility]1 [facility]   6/2/2025  8:00 AM Martha Bell FNP [facility] [place]    6/9/2025  9:00 AM Yefri Medrano PMHNP [facility]   6/16/2025  8:50 AM LAB, [facility] LABORATORY DRAW STATION [facility] NOEMI Braden [facility]   6/23/2025 10:00 AM Carrie Hawkins FNP-C [facility]   11/10/2025  9:00 AM Ariana Melton WHNP [facility]        Lab Frequency Next Occurrence   Mammo Digital Screening Bilat w/ Edd Once 04/07/2025   C-Reactive Protein Once 01/01/2025   Ambulatory referral/consult to Neurology Once 11/27/2024   Ambulatory referral/consult to Rheumatology Once 12/03/2024   CBC Auto Differential Once 12/27/2024   Comprehensive Metabolic Panel Once 12/27/2024   Lipid Panel Once 12/27/2024   Hemoglobin A1C Once 12/27/2024   TSH Once 12/27/2024

## 2025-03-24 NOTE — ASSESSMENT & PLAN NOTE
Fasting glucose elevated since stopping Mounjaro.    Encouraged DM diet.    Start actos, repeat labs in 3 months.

## 2025-03-24 NOTE — ASSESSMENT & PLAN NOTE
Start nabumetone, stop all other NSAIDs.  Handout provided, encouraged continued use of forearm-based thumb spica splint, ice.

## 2025-03-31 ENCOUNTER — TELEPHONE (OUTPATIENT)
Dept: FAMILY MEDICINE | Facility: CLINIC | Age: 40
End: 2025-03-31
Payer: MEDICAID

## 2025-03-31 DIAGNOSIS — E78.5 HYPERLIPIDEMIA, UNSPECIFIED HYPERLIPIDEMIA TYPE: Primary | ICD-10-CM

## 2025-04-01 RX ORDER — ROSUVASTATIN CALCIUM 10 MG/1
10 TABLET, COATED ORAL NIGHTLY
Qty: 90 TABLET | Refills: 3 | Status: SHIPPED | OUTPATIENT
Start: 2025-04-01

## 2025-04-09 ENCOUNTER — TELEPHONE (OUTPATIENT)
Dept: FAMILY MEDICINE | Facility: CLINIC | Age: 40
End: 2025-04-09
Payer: MEDICAID

## 2025-04-09 NOTE — TELEPHONE ENCOUNTER
Sugar is being removed in her urine.  Use unscented baby wipes and Desitin twice a day.  Increase water intake.  Call for any worsening of symptoms.

## 2025-04-21 ENCOUNTER — HOSPITAL ENCOUNTER (OUTPATIENT)
Dept: RADIOLOGY | Facility: HOSPITAL | Age: 40
Discharge: HOME | End: 2025-04-21
Payer: MEDICAID

## 2025-04-21 DIAGNOSIS — Z12.31 BREAST CANCER SCREENING BY MAMMOGRAM: ICD-10-CM

## 2025-04-21 PROCEDURE — 77067 SCR MAMMO BI INCL CAD: CPT | Mod: TC

## 2025-04-21 PROCEDURE — 77067 SCR MAMMO BI INCL CAD: CPT | Mod: 26,,, | Performed by: RADIOLOGY

## 2025-04-21 PROCEDURE — 77063 BREAST TOMOSYNTHESIS BI: CPT | Mod: 26,,, | Performed by: RADIOLOGY

## 2025-04-29 DIAGNOSIS — Z79.4 TYPE 2 DIABETES MELLITUS WITHOUT COMPLICATION, WITH LONG-TERM CURRENT USE OF INSULIN: Primary | ICD-10-CM

## 2025-04-29 DIAGNOSIS — E11.9 TYPE 2 DIABETES MELLITUS WITHOUT COMPLICATION, WITH LONG-TERM CURRENT USE OF INSULIN: Primary | ICD-10-CM

## 2025-04-29 RX ORDER — EMPAGLIFLOZIN 10 MG/1
10 TABLET, FILM COATED ORAL
Qty: 30 TABLET | Refills: 0 | Status: SHIPPED | OUTPATIENT
Start: 2025-04-29

## 2025-05-16 ENCOUNTER — TELEPHONE (OUTPATIENT)
Dept: FAMILY MEDICINE | Facility: CLINIC | Age: 40
End: 2025-05-16
Payer: MEDICAID

## 2025-05-16 DIAGNOSIS — Z79.4 TYPE 2 DIABETES MELLITUS WITHOUT COMPLICATION, WITH LONG-TERM CURRENT USE OF INSULIN: Primary | ICD-10-CM

## 2025-05-16 DIAGNOSIS — E11.9 TYPE 2 DIABETES MELLITUS WITHOUT COMPLICATION, WITH LONG-TERM CURRENT USE OF INSULIN: Primary | ICD-10-CM

## 2025-05-16 RX ORDER — PEN NEEDLE, DIABETIC 29 G X1/2"
2 NEEDLE, DISPOSABLE MISCELLANEOUS DAILY
Qty: 100 EACH | Refills: 11 | Status: SHIPPED | OUTPATIENT
Start: 2025-05-16

## 2025-05-26 DIAGNOSIS — E11.9 TYPE 2 DIABETES MELLITUS WITHOUT COMPLICATION, WITH LONG-TERM CURRENT USE OF INSULIN: ICD-10-CM

## 2025-05-26 DIAGNOSIS — Z79.4 TYPE 2 DIABETES MELLITUS WITHOUT COMPLICATION, WITH LONG-TERM CURRENT USE OF INSULIN: ICD-10-CM

## 2025-05-27 RX ORDER — EMPAGLIFLOZIN 10 MG/1
10 TABLET, FILM COATED ORAL
Qty: 30 TABLET | Refills: 0 | Status: SHIPPED | OUTPATIENT
Start: 2025-05-27

## 2025-05-28 ENCOUNTER — TELEPHONE (OUTPATIENT)
Dept: FAMILY MEDICINE | Facility: CLINIC | Age: 40
End: 2025-05-28
Payer: MEDICAID

## 2025-05-28 DIAGNOSIS — Z79.4 TYPE 2 DIABETES MELLITUS WITHOUT COMPLICATION, WITH LONG-TERM CURRENT USE OF INSULIN: ICD-10-CM

## 2025-05-28 DIAGNOSIS — E11.9 TYPE 2 DIABETES MELLITUS WITHOUT COMPLICATION, WITH LONG-TERM CURRENT USE OF INSULIN: ICD-10-CM

## 2025-05-28 RX ORDER — BLOOD-GLUCOSE SENSOR
1 EACH MISCELLANEOUS
Qty: 2 EACH | Refills: 11 | Status: SHIPPED | OUTPATIENT
Start: 2025-05-28

## 2025-06-02 ENCOUNTER — HOSPITAL ENCOUNTER (OUTPATIENT)
Dept: RADIOLOGY | Facility: HOSPITAL | Age: 40
Discharge: HOME | End: 2025-06-02
Attending: NURSE PRACTITIONER
Payer: MEDICAID

## 2025-06-02 ENCOUNTER — OFFICE VISIT (OUTPATIENT)
Dept: RHEUMATOLOGY | Facility: CLINIC | Age: 40
End: 2025-06-02
Payer: MEDICAID

## 2025-06-02 VITALS
HEIGHT: 62 IN | TEMPERATURE: 98 F | HEART RATE: 82 BPM | SYSTOLIC BLOOD PRESSURE: 111 MMHG | DIASTOLIC BLOOD PRESSURE: 77 MMHG | BODY MASS INDEX: 23.19 KG/M2 | OXYGEN SATURATION: 100 % | WEIGHT: 126 LBS | RESPIRATION RATE: 18 BRPM

## 2025-06-02 DIAGNOSIS — M79.642 BILATERAL HAND PAIN: ICD-10-CM

## 2025-06-02 DIAGNOSIS — M79.641 BILATERAL HAND PAIN: ICD-10-CM

## 2025-06-02 DIAGNOSIS — M79.644 FINGER PAIN, RIGHT: ICD-10-CM

## 2025-06-02 DIAGNOSIS — F41.1 GENERALIZED ANXIETY DISORDER: ICD-10-CM

## 2025-06-02 DIAGNOSIS — F33.1 MAJOR DEPRESSIVE DISORDER, RECURRENT EPISODE, MODERATE: ICD-10-CM

## 2025-06-02 DIAGNOSIS — R76.8 RHEUMATOID FACTOR POSITIVE: ICD-10-CM

## 2025-06-02 DIAGNOSIS — M79.671 RIGHT FOOT PAIN: ICD-10-CM

## 2025-06-02 DIAGNOSIS — M65.4 DE QUERVAIN'S TENOSYNOVITIS, LEFT: ICD-10-CM

## 2025-06-02 DIAGNOSIS — L40.9 PSORIASIS: ICD-10-CM

## 2025-06-02 DIAGNOSIS — R76.8 RHEUMATOID FACTOR POSITIVE: Primary | ICD-10-CM

## 2025-06-02 DIAGNOSIS — Z87.891 HISTORY OF TOBACCO USE: ICD-10-CM

## 2025-06-02 PROBLEM — E11.9 TYPE 2 DIABETES MELLITUS: Status: ACTIVE | Noted: 2025-06-02

## 2025-06-02 PROCEDURE — 73130 X-RAY EXAM OF HAND: CPT | Mod: TC,RT

## 2025-06-02 PROCEDURE — 99205 OFFICE O/P NEW HI 60 MIN: CPT | Mod: S$PBB,,, | Performed by: NURSE PRACTITIONER

## 2025-06-02 PROCEDURE — 3066F NEPHROPATHY DOC TX: CPT | Mod: CPTII,,, | Performed by: NURSE PRACTITIONER

## 2025-06-02 PROCEDURE — 3008F BODY MASS INDEX DOCD: CPT | Mod: CPTII,,, | Performed by: NURSE PRACTITIONER

## 2025-06-02 PROCEDURE — 99214 OFFICE O/P EST MOD 30 MIN: CPT | Mod: PBBFAC,25 | Performed by: NURSE PRACTITIONER

## 2025-06-02 PROCEDURE — 3074F SYST BP LT 130 MM HG: CPT | Mod: CPTII,,, | Performed by: NURSE PRACTITIONER

## 2025-06-02 PROCEDURE — 73630 X-RAY EXAM OF FOOT: CPT | Mod: TC,RT

## 2025-06-02 PROCEDURE — 1160F RVW MEDS BY RX/DR IN RCRD: CPT | Mod: CPTII,,, | Performed by: NURSE PRACTITIONER

## 2025-06-02 PROCEDURE — 73130 X-RAY EXAM OF HAND: CPT | Mod: TC,LT

## 2025-06-02 PROCEDURE — 3078F DIAST BP <80 MM HG: CPT | Mod: CPTII,,, | Performed by: NURSE PRACTITIONER

## 2025-06-02 PROCEDURE — 1159F MED LIST DOCD IN RCRD: CPT | Mod: CPTII,,, | Performed by: NURSE PRACTITIONER

## 2025-06-02 PROCEDURE — 3061F NEG MICROALBUMINURIA REV: CPT | Mod: CPTII,,, | Performed by: NURSE PRACTITIONER

## 2025-06-02 RX ORDER — PREDNISONE 5 MG/1
TABLET ORAL
Qty: 5 TABLET | Refills: 0 | Status: SHIPPED | OUTPATIENT
Start: 2025-06-02

## 2025-06-05 ENCOUNTER — RESULTS FOLLOW-UP (OUTPATIENT)
Dept: RHEUMATOLOGY | Facility: CLINIC | Age: 40
End: 2025-06-05
Payer: MEDICAID

## 2025-06-09 ENCOUNTER — OFFICE VISIT (OUTPATIENT)
Dept: BEHAVIORAL HEALTH | Facility: CLINIC | Age: 40
End: 2025-06-09
Payer: MEDICAID

## 2025-06-09 ENCOUNTER — TELEPHONE (OUTPATIENT)
Dept: RHEUMATOLOGY | Facility: CLINIC | Age: 40
End: 2025-06-09
Payer: MEDICAID

## 2025-06-09 VITALS
WEIGHT: 125.44 LBS | TEMPERATURE: 98 F | BODY MASS INDEX: 23.08 KG/M2 | OXYGEN SATURATION: 99 % | DIASTOLIC BLOOD PRESSURE: 64 MMHG | HEART RATE: 78 BPM | HEIGHT: 62 IN | SYSTOLIC BLOOD PRESSURE: 100 MMHG

## 2025-06-09 DIAGNOSIS — L40.50 PSORIATIC ARTHRITIS: Primary | ICD-10-CM

## 2025-06-09 DIAGNOSIS — R76.8 RHEUMATOID FACTOR POSITIVE: Primary | ICD-10-CM

## 2025-06-09 DIAGNOSIS — D84.821 DRUG-INDUCED IMMUNODEFICIENCY: ICD-10-CM

## 2025-06-09 DIAGNOSIS — Z79.899 DRUG-INDUCED IMMUNODEFICIENCY: ICD-10-CM

## 2025-06-09 DIAGNOSIS — Z79.899 HIGH RISK MEDICATION USE: ICD-10-CM

## 2025-06-09 DIAGNOSIS — G47.00 INSOMNIA, UNSPECIFIED TYPE: ICD-10-CM

## 2025-06-09 DIAGNOSIS — F41.1 GENERALIZED ANXIETY DISORDER: ICD-10-CM

## 2025-06-09 DIAGNOSIS — L40.9 PSORIASIS: ICD-10-CM

## 2025-06-09 DIAGNOSIS — F90.9 ATTENTION DEFICIT HYPERACTIVITY DISORDER (ADHD), UNSPECIFIED ADHD TYPE: ICD-10-CM

## 2025-06-09 DIAGNOSIS — F33.1 MAJOR DEPRESSIVE DISORDER, RECURRENT EPISODE, MODERATE: Primary | ICD-10-CM

## 2025-06-09 PROCEDURE — 3078F DIAST BP <80 MM HG: CPT | Mod: CPTII,,, | Performed by: NURSE PRACTITIONER

## 2025-06-09 PROCEDURE — 3074F SYST BP LT 130 MM HG: CPT | Mod: CPTII,,, | Performed by: NURSE PRACTITIONER

## 2025-06-09 PROCEDURE — 3061F NEG MICROALBUMINURIA REV: CPT | Mod: CPTII,,, | Performed by: NURSE PRACTITIONER

## 2025-06-09 PROCEDURE — 1159F MED LIST DOCD IN RCRD: CPT | Mod: CPTII,,, | Performed by: NURSE PRACTITIONER

## 2025-06-09 PROCEDURE — 3066F NEPHROPATHY DOC TX: CPT | Mod: CPTII,,, | Performed by: NURSE PRACTITIONER

## 2025-06-09 PROCEDURE — 3008F BODY MASS INDEX DOCD: CPT | Mod: CPTII,,, | Performed by: NURSE PRACTITIONER

## 2025-06-09 PROCEDURE — 99214 OFFICE O/P EST MOD 30 MIN: CPT | Mod: SA,HB,, | Performed by: NURSE PRACTITIONER

## 2025-06-09 PROCEDURE — 1160F RVW MEDS BY RX/DR IN RCRD: CPT | Mod: CPTII,,, | Performed by: NURSE PRACTITIONER

## 2025-06-09 RX ORDER — FOLIC ACID 1 MG/1
1 TABLET ORAL DAILY
Qty: 90 TABLET | Refills: 1 | Status: SHIPPED | OUTPATIENT
Start: 2025-06-09 | End: 2026-06-09

## 2025-06-09 RX ORDER — METHOTREXATE 2.5 MG/1
TABLET ORAL
Qty: 26 TABLET | Refills: 0 | Status: SHIPPED | OUTPATIENT
Start: 2025-06-09

## 2025-06-09 RX ORDER — HYDROXYZINE HYDROCHLORIDE 10 MG/1
10 TABLET, FILM COATED ORAL NIGHTLY
Qty: 30 TABLET | Refills: 2 | Status: SHIPPED | OUTPATIENT
Start: 2025-06-09

## 2025-06-09 RX ORDER — DEXTROAMPHETAMINE SACCHARATE, AMPHETAMINE ASPARTATE, DEXTROAMPHETAMINE SULFATE AND AMPHETAMINE SULFATE 7.5; 7.5; 7.5; 7.5 MG/1; MG/1; MG/1; MG/1
30 TABLET ORAL 2 TIMES DAILY
Qty: 60 TABLET | Refills: 0 | Status: SHIPPED | OUTPATIENT
Start: 2025-06-09 | End: 2025-07-09

## 2025-06-09 RX ORDER — ESCITALOPRAM OXALATE 20 MG/1
20 TABLET ORAL DAILY
Qty: 30 TABLET | Refills: 2 | Status: SHIPPED | OUTPATIENT
Start: 2025-06-09

## 2025-06-09 RX ORDER — ESCITALOPRAM OXALATE 5 MG/1
5 TABLET ORAL DAILY
Qty: 30 TABLET | Refills: 2 | Status: SHIPPED | OUTPATIENT
Start: 2025-06-09

## 2025-06-09 NOTE — TELEPHONE ENCOUNTER
Called patient and notified her of the previous message patient verbalized understanding. Reminder set within the system.

## 2025-06-09 NOTE — PROGRESS NOTES
"PSYCHIATRIC FOLLOW-UP VISIT NOTE    Chief Complaint   Patient presents with    Medication Management     Medication management         History of Present Illness  40 y.o. year old White female with hx of MDD, nomi, ADHD, and insomnia seen today for follow-up appointment and medication management.  Patient reports that she has been doing very well since our last visit.  Denies any recent depression.  Anxiety is generally well-controlled with current medication regimen.  Denies any recent stressors she is not coping with the effectively.  She was recently diagnosed with rheumatoid arthritis and will begin taking methotrexate today.  Has some normative anxiety regarding this new diagnosis and new medication, but she is hopeful he will provide her with some relief.  Has not been isolative, has several trips planned this summer with family and friends.  Good focus and concentration with current dosage of Adderall.  Attending to her responsibilities at work and at home without significant difficulty. Patient denies SI/HI. Denies hallucinations and does not appear to be responding to internal stimuli or be internally preoccupied. No manic symptoms noted.      Objective:     Vitals:  Vitals:    06/09/25 0845   BP: 100/64   BP Location: Right arm   Patient Position: Sitting   Pulse: 78   Temp: 97.5 °F (36.4 °C)   TempSrc: Temporal   SpO2: 99%   Weight: 56.9 kg (125 lb 7.1 oz)   Height: 5' 2.01" (1.575 m)       Wt Readings from Last 3 Encounters:   06/09/25 0845 56.9 kg (125 lb 7.1 oz)   06/02/25 0749 57.2 kg (126 lb)   03/24/25 1332 59.7 kg (131 lb 9.6 oz)         Medication:  Current Medications[1]       Significant Labs: - none at this time    Significant Imaging: - none at this time    Physical Exam     See HPI    Review of Systems     Mental Status Exam:  Presentation:  - Appearance: 40 y.o. year old White female, appears stated age, appears Casually dressed and Well groomed  - Motility: Erect when standing, Steady gait, " "No EPS or Tremors, No psychomotor agitation or retardation appreciated  - Behavior: calm, cooperative, good eye contact  Speech:  - Character/Organization: spontaneous, fluent, normal volume, normal rate, normal rhythm  Emotional State:  - Mood: "happy"   - Affect: congruent and appropriate  Thought:  - Process: logical, linear, organized , goal-directed  - Preoccupations: no ruminations, rituals, or phobias appreciated  - Delusions: no persecutory, paranoid, or grandiose delusions appreciated  - Perception: denies AVH, not actively responding to internal stimuli  - SI/HI: denies/denies  Sensorium & Intellect:  - Sensorium: AAOx4  - Memory: intact to recent and remote events  - Attention/Concentration: good/good  - Insight/Judgement: good/good    Gait: normal swing and stance  MSK:no rigidity appreciated    All other systems without acute issues unless noted in HPI      Assessment/Plan      ICD-10-CM ICD-9-CM    1. Major depressive disorder, recurrent episode, moderate  F33.1 296.32 EScitalopram oxalate (LEXAPRO) 20 MG tablet      EScitalopram oxalate (LEXAPRO) 5 MG Tab      2. Generalized anxiety disorder  F41.1 300.02 hydrOXYzine HCL (ATARAX) 10 MG Tab      3. Attention deficit hyperactivity disorder (ADHD), unspecified ADHD type  F90.9 314.01 dextroamphetamine-amphetamine (ADDERALL) 30 mg Tab      4. Insomnia, unspecified type  G47.00 780.52          Continue current medications without change     checked. Results as expected. No suspected diversion or abuse of controlled substances.    Potential side effects and risks vs benefits of current treatment plan reviewed with patient. Applicable black box warnings reviewed. Encouraged patient not to alter dosages or abruptly discontinue medications without contacting prescriber first, due to risk of worsening symptoms and decompensation of mental status. Warned of risks associated with herbal remedies and supplements while taking psychotropic medications and of the " "need to consult prescriber prior to adding any of these to current regimen. Patient should abstain from abuse of alcohol, prescription medications, and illicit drugs. Reviewed when to contact clinic and/or seek emergent care, such as but not limited to, onset/worsening SI/HI, hallucinations, delusions, manic symptoms. Pt verbalized understanding and agreement of these warnings/recommendations and verbally consented to treatment plan.      Follow up in about 3 months (around 9/9/2025) for Medication Management.        Yefri Medrano, [facility]         [1]   Current Outpatient Medications:     DEXCOM G7 SENSOR Zara, 1 each by subcutaneous (via wearable injector) route every 14 (fourteen) days., Disp: 2 each, Rfl: 11    folic acid (FOLVITE) 1 MG tablet, Take 1 tablet (1 mg total) by mouth once daily., Disp: 90 tablet, Rfl: 1    HUMALOG KWIKPEN INSULIN 100 unit/mL pen, Inject into the skin. Per sliding scale, Disp: , Rfl:     JARDIANCE 10 mg tablet, TAKE 1 TABLET BY MOUTH ONCE DAILY, Disp: 30 tablet, Rfl: 0    LANTUS SOLOSTAR U-100 INSULIN 100 unit/mL (3 mL) InPn pen, Inject 6 Units into the skin nightly., Disp: 3 mL, Rfl: 11    metFORMIN (GLUCOPHAGE-XR) 500 MG ER 24hr tablet, Take 1 tablet (500 mg total) by mouth 2 (two) times daily., Disp: 180 tablet, Rfl: 11    methotrexate 2.5 MG Tab, Take 4 tablets (10 mg total) by mouth once a week for 2 weeks. Then, take 6 tablets (15 mg total) by mouth once a week for 2 weeks. Please hold for fever or infections., Disp: 26 tablet, Rfl: 0    nabumetone (RELAFEN) 750 MG tablet, Take 1 tablet (750 mg total) by mouth 2 (two) times daily., Disp: 60 tablet, Rfl: 3    pen needle, diabetic 31 gauge x 1/4" Ndle, 2 each by Misc.(Non-Drug; Combo Route) route once daily., Disp: 100 each, Rfl: 11    rosuvastatin (CRESTOR) 10 MG tablet, Take 1 tablet (10 mg total) by mouth every evening., Disp: 90 tablet, Rfl: 3    valACYclovir (VALTREX) 500 MG tablet, TAKE 1 TABLET BY MOUTH ONCE DAILY, " Disp: 30 tablet, Rfl: 4    dextroamphetamine-amphetamine (ADDERALL) 30 mg Tab, Take 1 tablet (30 mg total) by mouth 2 (two) times a day., Disp: 60 tablet, Rfl: 0    EScitalopram oxalate (LEXAPRO) 20 MG tablet, Take 1 tablet (20 mg total) by mouth once daily. Take with 5mg tablet for total dose of 25mg/day, Disp: 30 tablet, Rfl: 2    EScitalopram oxalate (LEXAPRO) 5 MG Tab, Take 1 tablet (5 mg total) by mouth once daily. Take with 20mg tab for total dose of 25mg/day, Disp: 30 tablet, Rfl: 2    hydrOXYzine HCL (ATARAX) 10 MG Tab, Take 1 tablet (10 mg total) by mouth every evening., Disp: 30 tablet, Rfl: 2

## 2025-06-09 NOTE — TELEPHONE ENCOUNTER
Orders for CBC/CMP and Cxray (can complete with follow up labs) in 4 weeks after starting MTX if we can also place a reminder, thanks - MTX Rx sent over

## 2025-06-09 NOTE — TELEPHONE ENCOUNTER
----- Message from ANNA Pham sent at 6/5/2025  7:14 AM CDT -----  Please advise the patient the following lab and x-ray results:  Bilateral hand and right foot x-ray noted to be okay, labs as follows:  Mildly positive rheumatoid factor noted (4.4 normal is <3.5),   uric acid okay, infection workup negative.  We did discuss starting treatment at her recent ov, she does have a sulfa allergy, would recommend starting MTX as directed (history of BTL).  I will   attach our PharmD to reach out and review medication at length along with side-effects, once she agrees to medication initiation we will send prescription over to pharmacy.  We had we will need   repeat labs in 4 weeks once medication is started.    - To be reviewed: risks and benefits of Methotrexate (MTX) in detail. MTX is an immunosuppressant medication.  When used in high doses (such as in cancer), it may have many side effects.  The doses   that are used in rheumatological disorders are much lower.  Side effects were explained to the patient, including kidney and liver damage, bone marrow suppression, increased infection risk, mouth   sores, hair loss, nausea, GI symptoms.   Start Methotrexate at 10 mg per week x 2 weeks then increase dose to 15 mg po qweek.  Start folic 1mg daily to prevent some of the side effects of methotrexate.  Patient to call with any concerns and adverse effects of MTX.  - Methotrexate is also teratogenic, so birth control is needed while on this medication if applicable (hx BTL).  Counselled on limiting alcohol use to 1-2 drinks/week while on methotrexate given   potential liver toxicity.    ----- Message -----  From: Lab, Background User  Sent: 6/2/2025  10:11 AM CDT  To: ANNA Guerra

## 2025-06-16 ENCOUNTER — PATIENT MESSAGE (OUTPATIENT)
Dept: FAMILY MEDICINE | Facility: CLINIC | Age: 40
End: 2025-06-16
Payer: MEDICAID

## 2025-06-20 ENCOUNTER — LAB VISIT (OUTPATIENT)
Dept: LAB | Facility: HOSPITAL | Age: 40
End: 2025-06-20
Attending: NURSE PRACTITIONER
Payer: MEDICAID

## 2025-06-20 DIAGNOSIS — Z00.00 ENCOUNTER FOR ADULT WELLNESS VISIT: ICD-10-CM

## 2025-06-20 LAB
ALBUMIN SERPL-MCNC: 4.3 G/DL (ref 3.4–5)
ALBUMIN/GLOB SERPL: 1.5 RATIO
ALP SERPL-CCNC: 57 UNIT/L (ref 50–144)
ALT SERPL-CCNC: 33 UNIT/L (ref 1–45)
ANION GAP SERPL CALC-SCNC: 2 MEQ/L (ref 2–13)
AST SERPL-CCNC: 32 UNIT/L (ref 14–36)
BASOPHILS # BLD AUTO: 0.04 X10(3)/MCL (ref 0.01–0.08)
BASOPHILS NFR BLD AUTO: 0.6 % (ref 0.1–1.2)
BILIRUB SERPL-MCNC: 0.7 MG/DL (ref 0–1)
BUN SERPL-MCNC: 11 MG/DL (ref 7–20)
CALCIUM SERPL-MCNC: 9.2 MG/DL (ref 8.4–10.2)
CHLORIDE SERPL-SCNC: 105 MMOL/L (ref 98–110)
CHOLEST SERPL-MCNC: 150 MG/DL (ref 0–200)
CO2 SERPL-SCNC: 30 MMOL/L (ref 21–32)
CREAT SERPL-MCNC: 0.74 MG/DL (ref 0.66–1.25)
CREAT/UREA NIT SERPL: 15 (ref 12–20)
EOSINOPHIL # BLD AUTO: 0.21 X10(3)/MCL (ref 0.04–0.36)
EOSINOPHIL NFR BLD AUTO: 3.3 % (ref 0.7–7)
ERYTHROCYTE [DISTWIDTH] IN BLOOD BY AUTOMATED COUNT: 11.9 % (ref 11–14.5)
EST. AVERAGE GLUCOSE BLD GHB EST-MCNC: 174.3 MG/DL (ref 70–115)
GFR SERPLBLD CREATININE-BSD FMLA CKD-EPI: >90 ML/MIN/1.73/M2
GLOBULIN SER-MCNC: 2.8 GM/DL (ref 2–3.9)
GLUCOSE SERPL-MCNC: 114 MG/DL (ref 70–115)
HBA1C MFR BLD: 7.7 % (ref 4–6)
HCT VFR BLD AUTO: 37.8 % (ref 36–48)
HDLC SERPL-MCNC: 93 MG/DL (ref 40–60)
HGB BLD-MCNC: 12.9 G/DL (ref 11.8–16)
IMM GRANULOCYTES # BLD AUTO: 0.02 X10(3)/MCL (ref 0–0.03)
IMM GRANULOCYTES NFR BLD AUTO: 0.3 % (ref 0–0.5)
LDLC SERPL DIRECT ASSAY-SCNC: 34.6 MG/DL (ref 30–100)
LYMPHOCYTES # BLD AUTO: 2.78 X10(3)/MCL (ref 1.16–3.74)
LYMPHOCYTES NFR BLD AUTO: 43.1 % (ref 20–55)
MCH RBC QN AUTO: 34.2 PG (ref 27–34)
MCHC RBC AUTO-ENTMCNC: 34.1 G/DL (ref 31–37)
MCV RBC AUTO: 100.3 FL (ref 79–99)
MONOCYTES # BLD AUTO: 0.46 X10(3)/MCL (ref 0.24–0.36)
MONOCYTES NFR BLD AUTO: 7.1 % (ref 4.7–12.5)
NEUTROPHILS # BLD AUTO: 2.94 X10(3)/MCL (ref 1.56–6.13)
NEUTROPHILS NFR BLD AUTO: 45.6 % (ref 37–73)
NRBC BLD AUTO-RTO: 0 %
PLATELET # BLD AUTO: 342 X10(3)/MCL (ref 140–371)
PMV BLD AUTO: 9.6 FL (ref 9.4–12.4)
POTASSIUM SERPL-SCNC: 4.8 MMOL/L (ref 3.5–5.1)
PROT SERPL-MCNC: 7.1 GM/DL (ref 6.3–8.2)
RBC # BLD AUTO: 3.77 X10(6)/MCL (ref 4–5.1)
SODIUM SERPL-SCNC: 137 MMOL/L (ref 136–145)
TRIGL SERPL-MCNC: 39 MG/DL (ref 30–200)
TSH SERPL-ACNC: 1.41 UIU/ML (ref 0.36–3.74)
WBC # BLD AUTO: 6.45 X10(3)/MCL (ref 4–11.5)

## 2025-06-20 PROCEDURE — 85025 COMPLETE CBC W/AUTO DIFF WBC: CPT

## 2025-06-20 PROCEDURE — 83036 HEMOGLOBIN GLYCOSYLATED A1C: CPT

## 2025-06-20 PROCEDURE — 80061 LIPID PANEL: CPT

## 2025-06-20 PROCEDURE — 84443 ASSAY THYROID STIM HORMONE: CPT

## 2025-06-20 PROCEDURE — 80053 COMPREHEN METABOLIC PANEL: CPT

## 2025-06-20 PROCEDURE — 36415 COLL VENOUS BLD VENIPUNCTURE: CPT

## 2025-06-23 ENCOUNTER — OFFICE VISIT (OUTPATIENT)
Dept: FAMILY MEDICINE | Facility: CLINIC | Age: 40
End: 2025-06-23
Payer: MEDICAID

## 2025-06-23 VITALS
WEIGHT: 125.81 LBS | HEIGHT: 62 IN | BODY MASS INDEX: 23.15 KG/M2 | SYSTOLIC BLOOD PRESSURE: 100 MMHG | OXYGEN SATURATION: 98 % | DIASTOLIC BLOOD PRESSURE: 82 MMHG | HEART RATE: 85 BPM | TEMPERATURE: 98 F

## 2025-06-23 DIAGNOSIS — Z79.4 TYPE 2 DIABETES MELLITUS WITHOUT COMPLICATION, WITH LONG-TERM CURRENT USE OF INSULIN: ICD-10-CM

## 2025-06-23 DIAGNOSIS — E11.9 TYPE 2 DIABETES MELLITUS WITHOUT COMPLICATION, WITH LONG-TERM CURRENT USE OF INSULIN: ICD-10-CM

## 2025-06-23 DIAGNOSIS — Z00.00 ENCOUNTER FOR ROUTINE ADULT HEALTH EXAMINATION WITHOUT ABNORMAL FINDINGS: Primary | ICD-10-CM

## 2025-06-23 DIAGNOSIS — L40.9 PSORIASIS: ICD-10-CM

## 2025-06-23 DIAGNOSIS — F41.1 GENERALIZED ANXIETY DISORDER: ICD-10-CM

## 2025-06-23 DIAGNOSIS — F33.1 MAJOR DEPRESSIVE DISORDER, RECURRENT EPISODE, MODERATE: ICD-10-CM

## 2025-06-23 DIAGNOSIS — E78.5 HYPERLIPIDEMIA, UNSPECIFIED HYPERLIPIDEMIA TYPE: ICD-10-CM

## 2025-06-23 PROCEDURE — 3074F SYST BP LT 130 MM HG: CPT | Mod: CPTII,,, | Performed by: NURSE PRACTITIONER

## 2025-06-23 PROCEDURE — 1159F MED LIST DOCD IN RCRD: CPT | Mod: CPTII,,, | Performed by: NURSE PRACTITIONER

## 2025-06-23 PROCEDURE — 3008F BODY MASS INDEX DOCD: CPT | Mod: CPTII,,, | Performed by: NURSE PRACTITIONER

## 2025-06-23 PROCEDURE — 3079F DIAST BP 80-89 MM HG: CPT | Mod: CPTII,,, | Performed by: NURSE PRACTITIONER

## 2025-06-23 PROCEDURE — 99396 PREV VISIT EST AGE 40-64: CPT | Mod: ,,, | Performed by: NURSE PRACTITIONER

## 2025-06-23 PROCEDURE — 3051F HG A1C>EQUAL 7.0%<8.0%: CPT | Mod: CPTII,,, | Performed by: NURSE PRACTITIONER

## 2025-06-23 PROCEDURE — 1160F RVW MEDS BY RX/DR IN RCRD: CPT | Mod: CPTII,,, | Performed by: NURSE PRACTITIONER

## 2025-06-23 PROCEDURE — 2023F DILAT RTA XM W/O RTNOPTHY: CPT | Mod: CPTII,,, | Performed by: NURSE PRACTITIONER

## 2025-06-23 PROCEDURE — 3061F NEG MICROALBUMINURIA REV: CPT | Mod: CPTII,,, | Performed by: NURSE PRACTITIONER

## 2025-06-23 PROCEDURE — 3066F NEPHROPATHY DOC TX: CPT | Mod: CPTII,,, | Performed by: NURSE PRACTITIONER

## 2025-06-23 NOTE — PROGRESS NOTES
Patient ID: [id number]     Chief Complaint: Annual Exam      Subjective     Nahomi Warren is a 40 y.o. female in the office for Annual Exam      History of Present Illness    CHIEF COMPLAINT:  Patient presents today for follow up of diabetes management.    DIABETES MANAGEMENT:  She reports progressive decline in C-peptide levels from 2.0 in February 2022 to 1.4 in August 2024, indicating decreased beta cell function. She currently manages diabetes with Lantus 14-15 units nightly and occasional mealtime Humalog insulin. She experiences blood sugar fluctuations, with hypoglycemic episodes as low as 40, and reports difficulty recognizing hypoglycemic symptoms. Blood sugar control is better during work days compared to weekends or days off. Her menstrual cycle impacts diabetes management with increased difficulty controlling blood sugar during this time. She previously preferred higher blood sugar to avoid hypoglycemic episodes that impair her functioning. Her dietary habits include daily Redbull energy drinks, largest meals at night, and eating immediately before bedtime.    WEIGHT MANAGEMENT:  She reports unintentional weight loss of 10 lbs over past 3-4 months, from 135 lbs on 3/3 to current weight of 125 lbs, without intentional dietary or lifestyle changes.    RHEUMATOID ARTHRITIS:  Recent rheumatology evaluation revealed slightly elevated rheumatoid levels. X-rays were unremarkable. She was initiated on Methotrexate for management of potential rheumatoid arthritis.     ROS:  ROS as indicated in HPI.         Past Medical History:  has a past medical history of ADHD (attention deficit hyperactivity disorder), Anemia, Anxiety, Depression, Diabetes mellitus, Elevated liver enzymes, Insomnia, Panic attack, and Rheumatoid arthritis, unspecified.    Social History:  reports that she quit smoking about 13 months ago. Her smoking use included cigarettes and vaping with nicotine. She started smoking about 16 years ago. She  "has a 7.9 pack-year smoking history. She has never used smokeless tobacco. She reports current alcohol use of about 1.0 standard drink of alcohol per week. She reports that she does not use drugs.    Current Outpatient Medications   Medication Instructions    DEXCOM G7 SENSOR Zara 1 each, subcutaneous (via wearable injector), Every 14 days    dextroamphetamine-amphetamine (ADDERALL) 30 mg Tab 30 mg, Oral, 2 times daily    EScitalopram oxalate (LEXAPRO) 20 mg, Oral, Daily, Take with 5mg tablet for total dose of 25mg/day    EScitalopram oxalate (LEXAPRO) 5 mg, Oral, Daily, Take with 20mg tab for total dose of 25mg/day    folic acid (FOLVITE) 1 mg, Oral, Daily    HUMALOG KWIKPEN INSULIN 100 unit/mL pen Inject into the skin. Per sliding scale    hydrOXYzine HCL (ATARAX) 10 mg, Oral, Nightly    LANTUS SOLOSTAR U-100 INSULIN 6 Units, Subcutaneous, Nightly    metFORMIN (GLUCOPHAGE-XR) 500 mg, Oral, 2 times daily    methotrexate 2.5 MG Tab Take 6 tablets (15 mg total) by mouth once a week for 2 weeks. Please hold for fever or infections.    nabumetone (RELAFEN) 750 mg, Oral, 2 times daily    pen needle, diabetic 31 gauge x 1/4" Ndle 2 each, Misc.(Non-Drug; Combo Route), Daily    rosuvastatin (CRESTOR) 10 mg, Oral, Nightly    valACYclovir (VALTREX) 500 mg, Oral       Patient is allergic to sulfa (sulfonamide antibiotics).     Patient Care Team:  Carrie Hawkins FNP-C as PCP - General (Family Medicine)  Miguel Zavala MD as Consulting Physician (Endocrinology)  Hossein García MD as Consulting Physician (Obstetrics and Gynecology)  [facility], [facility]     Objective     Visit Vitals  /82 (BP Location: Left arm, Patient Position: Sitting)   Pulse 85   Temp 98.4 °F (36.9 °C) (Temporal)   Ht 5' 2.01" (1.575 m)   Wt 57.1 kg (125 lb 12.8 oz)   LMP 06/11/2025 (Exact Date)   SpO2 98%   BMI 23.00 kg/m²       Physical Exam  Vitals reviewed.   Constitutional:       General: She is not in acute distress.  HENT:      Head: " Normocephalic and atraumatic.      Right Ear: External ear normal.      Left Ear: External ear normal.      Nose: Nose normal.      Mouth/Throat:      Mouth: Mucous membranes are moist.      Pharynx: Oropharynx is clear.   Eyes:      Pupils: Pupils are equal, round, and reactive to light.   Cardiovascular:      Rate and Rhythm: Normal rate and regular rhythm.   Pulmonary:      Effort: Pulmonary effort is normal.      Breath sounds: Normal breath sounds.   Abdominal:      General: Bowel sounds are normal.      Palpations: Abdomen is soft.      Tenderness: There is no abdominal tenderness.   Musculoskeletal:         General: Normal range of motion.      Cervical back: Normal range of motion and neck supple.      Right lower leg: No edema.      Left lower leg: No edema.   Lymphadenopathy:      Cervical: No cervical adenopathy.   Skin:     General: Skin is warm and dry.   Neurological:      Mental Status: She is alert and oriented to person, place, and time. Mental status is at baseline.      Gait: Gait normal.   Psychiatric:         Mood and Affect: Mood normal.         Behavior: Behavior normal.         Assessment & Plan     1. Encounter for routine adult health examination without abnormal findings    2. Type 2 diabetes mellitus without complication, with long-term current use of insulin  Overview:  On Lantus, humalog, metformin, jardiance. (She was afraid of actos side effects).  Off Mounjaro, was losing too much weight.     Assessment & Plan:  Lab Results   Component Value Date    HGBA1C 7.7 (H) 06/20/2025      Continue medications without change. Encouraged better dietary control.  Encouraged to follow ADA diet. Avoid soda, sweets, and limit rice/pasta/breads/starches (no more than 45-50 grams per meal).   Encouraged to implement exercise into daily regimen.  Encouraged compliance with both medications and diet to limit long term and negative effects from the disease.    Monitory glucose levels each morning and  record.  Please bring for review at next appointment.  Stressed importance of daily foot exams and annual dilated eye exams.      Orders:  -     [place] GENERIC ORDERABLE; Future; Expected date: 07/17/2025    3. Hyperlipidemia, unspecified hyperlipidemia type    4. Psoriasis    5. Major depressive disorder, recurrent episode, moderate  Overview:  On lexapro    Assessment & Plan:  Stable, continue current medication.        6. Generalized anxiety disorder  Overview:  On lexapro    Assessment & Plan:  Stable, continue current medication.             Assessment & Plan    Z00.00 Encounter for routine adult health exam without abnormal findings  E11.9, Z79.4 Type 2 diabetes mellitus without complication, with long-term current use of insulin  E78.5 Hyperlipidemia, unspecified hyperlipidemia type    IMPRESSION:  - Considered progression from type 2 to type 1 diabetes based on decreasing C-peptide levels (2.0 in February 2022, 1.4 in August 2024, 1.1 recently).  - Evaluated effectiveness of current diabetes management, particularly Jardiance, given potential loss of beta cell function.  - Considered transition to insulin pump therapy for more precise glucose control.  - Assessed need for adjustment of long-acting insulin (Lantus) dosage and timing.  - Evaluated impact of dietary choices, particularly energy drinks, on glucose levels.    E11.9, Z79.4 TYPE 2 DIABETES MELLITUS WITHOUT COMPLICATION, WITH LONG-TERM CURRENT USE OF INSULIN:  - Explained relationship between C-peptide levels and beta cell function in diabetes progression.  - Ordered YUDITH (Latent Autoimmune Diabetes in Adults) antibody test to further evaluate diabetes type.  - Medication adjustments: Increased Lantus to 18 units at night (emphasize consistent timing), started sliding scale insulin regimen for nighttime meal only (specific dosing instructions provided separately), and continued Jardiance at current low dose pending further evaluation.  - Patient to  monitor and record glucose levels, especially before and after meals.  - Discussed benefits of insulin pump therapy for more precise glucose management.  - Will consider e-consult with endocrinology to discuss insulin pump management and potential referral.  - Follow up when YUDITH test results are available.  - Patient instructed to contact office to schedule diabetes education and for endocrinology e-consult recommendations.          No follow-ups on file. In addition to their next scheduled appointment, the patient has also been instructed to follow up on as needed basis.     Future Appointments   Date Time Provider Department Center   9/8/2025  9:00 AM Martha Norwood FNP [facility]    9/15/2025  9:00 AM [facility]   10/6/2025 10:00 AM [facility]   11/10/2025  9:00 AM Ariana Melton [facility]        This note was generated with the assistance of ambient listening technology. Verbal consent was obtained by the patient and accompanying visitor(s) for the recording of patient appointment to facilitate this note. I attest to having reviewed and edited the generated note for accuracy, though some syntax or spelling errors may persist. Please contact the author of this note for any clarification.

## 2025-07-07 LAB
LEFT EYE DM RETINOPATHY: NEGATIVE
RIGHT EYE DM RETINOPATHY: NEGATIVE

## 2025-07-09 ENCOUNTER — HOSPITAL ENCOUNTER (OUTPATIENT)
Dept: RADIOLOGY | Facility: HOSPITAL | Age: 40
Discharge: HOME | End: 2025-07-09
Attending: NURSE PRACTITIONER
Payer: MEDICAID

## 2025-07-09 DIAGNOSIS — Z79.899 DRUG-INDUCED IMMUNODEFICIENCY: ICD-10-CM

## 2025-07-09 DIAGNOSIS — L40.50 PSORIATIC ARTHRITIS: ICD-10-CM

## 2025-07-09 DIAGNOSIS — D84.821 DRUG-INDUCED IMMUNODEFICIENCY: ICD-10-CM

## 2025-07-09 DIAGNOSIS — Z79.899 HIGH RISK MEDICATION USE: ICD-10-CM

## 2025-07-09 PROCEDURE — 71046 X-RAY EXAM CHEST 2 VIEWS: CPT | Mod: TC

## 2025-07-10 ENCOUNTER — PATIENT OUTREACH (OUTPATIENT)
Facility: CLINIC | Age: 40
End: 2025-07-10
Payer: MEDICAID

## 2025-07-10 ENCOUNTER — RESULTS FOLLOW-UP (OUTPATIENT)
Dept: FAMILY MEDICINE | Facility: CLINIC | Age: 40
End: 2025-07-10
Payer: MEDICAID

## 2025-07-10 DIAGNOSIS — L40.9 PSORIASIS: Primary | ICD-10-CM

## 2025-07-10 NOTE — PROGRESS NOTES
Health Maintenance Topic(s) Outreach Outcomes & Actions Taken:    Eye Exam - Outreach Outcomes & Actions Taken  : Diabetic Eye External Records Uploaded, Care Team & History Updated if Applicable     Additional Notes:  DM eye exam 7/7/25

## 2025-07-11 ENCOUNTER — LAB VISIT (OUTPATIENT)
Dept: LAB | Facility: HOSPITAL | Age: 40
End: 2025-07-11
Attending: NURSE PRACTITIONER
Payer: MEDICAID

## 2025-07-11 DIAGNOSIS — L40.9 PSORIASIS: ICD-10-CM

## 2025-07-11 LAB — VIT B12 SERPL-MCNC: 623 PG/ML (ref 211–946)

## 2025-07-11 PROCEDURE — 36415 COLL VENOUS BLD VENIPUNCTURE: CPT

## 2025-07-11 PROCEDURE — 82607 VITAMIN B-12: CPT

## 2025-07-17 NOTE — ASSESSMENT & PLAN NOTE
Lab Results   Component Value Date    HGBA1C 7.7 (H) 06/20/2025      Continue medications without change. Encouraged better dietary control.  Encouraged to follow ADA diet. Avoid soda, sweets, and limit rice/pasta/breads/starches (no more than 45-50 grams per meal).   Encouraged to implement exercise into daily regimen.  Encouraged compliance with both medications and diet to limit long term and negative effects from the disease.    Monitory glucose levels each morning and record.  Please bring for review at next appointment.  Stressed importance of daily foot exams and annual dilated eye exams.

## 2025-07-18 ENCOUNTER — LAB VISIT (OUTPATIENT)
Dept: LAB | Facility: HOSPITAL | Age: 40
End: 2025-07-18
Attending: NURSE PRACTITIONER
Payer: MEDICAID

## 2025-07-18 DIAGNOSIS — Z79.4 ENCOUNTER FOR LONG-TERM (CURRENT) USE OF INSULIN: Primary | ICD-10-CM

## 2025-07-18 DIAGNOSIS — E11.9 DIABETES MELLITUS WITHOUT COMPLICATION: ICD-10-CM

## 2025-07-18 PROCEDURE — 36415 COLL VENOUS BLD VENIPUNCTURE: CPT

## 2025-07-18 PROCEDURE — 86337 INSULIN ANTIBODIES: CPT

## 2025-07-21 DIAGNOSIS — M65.4 DE QUERVAIN'S TENOSYNOVITIS, LEFT: Primary | ICD-10-CM

## 2025-07-21 DIAGNOSIS — M79.89 SWELLING OF FINGER: ICD-10-CM

## 2025-07-31 ENCOUNTER — TELEPHONE (OUTPATIENT)
Dept: FAMILY MEDICINE | Facility: CLINIC | Age: 40
End: 2025-07-31
Payer: MEDICAID

## 2025-08-13 ENCOUNTER — PATIENT MESSAGE (OUTPATIENT)
Dept: FAMILY MEDICINE | Facility: CLINIC | Age: 40
End: 2025-08-13
Payer: MEDICAID

## 2025-09-02 DIAGNOSIS — F90.9 ATTENTION DEFICIT HYPERACTIVITY DISORDER (ADHD), UNSPECIFIED ADHD TYPE: ICD-10-CM

## 2025-09-02 RX ORDER — DEXTROAMPHETAMINE SACCHARATE, AMPHETAMINE ASPARTATE, DEXTROAMPHETAMINE SULFATE AND AMPHETAMINE SULFATE 7.5; 7.5; 7.5; 7.5 MG/1; MG/1; MG/1; MG/1
30 TABLET ORAL 2 TIMES DAILY
Qty: 60 TABLET | Refills: 0 | Status: SHIPPED | OUTPATIENT
Start: 2025-09-02 | End: 2025-10-02